# Patient Record
Sex: FEMALE | Race: ASIAN | NOT HISPANIC OR LATINO
[De-identification: names, ages, dates, MRNs, and addresses within clinical notes are randomized per-mention and may not be internally consistent; named-entity substitution may affect disease eponyms.]

---

## 2020-06-22 ENCOUNTER — APPOINTMENT (OUTPATIENT)
Dept: OBGYN | Facility: CLINIC | Age: 25
End: 2020-06-22
Payer: MEDICAID

## 2020-06-22 VITALS
DIASTOLIC BLOOD PRESSURE: 79 MMHG | HEART RATE: 77 BPM | HEIGHT: 66 IN | WEIGHT: 148 LBS | BODY MASS INDEX: 23.78 KG/M2 | SYSTOLIC BLOOD PRESSURE: 120 MMHG

## 2020-06-22 DIAGNOSIS — Z83.3 FAMILY HISTORY OF DIABETES MELLITUS: ICD-10-CM

## 2020-06-22 DIAGNOSIS — Z80.3 FAMILY HISTORY OF MALIGNANT NEOPLASM OF BREAST: ICD-10-CM

## 2020-06-22 PROCEDURE — 99203 OFFICE O/P NEW LOW 30 MIN: CPT

## 2020-06-24 LAB
ESTIMATED AVERAGE GLUCOSE: 103 MG/DL
HBA1C MFR BLD HPLC: 5.2 %
TSH SERPL-ACNC: 1.86 UIU/ML

## 2020-06-24 NOTE — COUNSELING
[Breast Self Exam] : breast self exam [Nutrition] : nutrition [Exercise] : exercise [Fertility Options] : fertility options [Vitamins/Supplements] : vitamins/supplements [Preconception Care] : preconception care

## 2020-06-24 NOTE — HISTORY OF PRESENT ILLNESS
[Last Pap ___] : Last cervical pap smear was [unfilled] [Sexually Active] : is sexually active [Monogamous (Male Partner)] : is monogamous with a male partner [Weight Concerns] : no concerns with her weight [Contraception] : does not use contraception

## 2020-06-24 NOTE — CHIEF COMPLAINT
[Initial Visit] : initial GYN visit [FreeTextEntry1] : Patient recently finished MPH at [employer] and is thinking of doing Phd vs medical school. Patient presently started working as a scribe.\par Patient has h/o irregular menses since age 18 but this past year has gotten more regular approx q35 days with occ skipping menses. Lmp 6/22 and prior was 4/19. Patient has been trying to conceive since 10/2019 and has been using ovulation kits.\par Patient in 2018 had bloods and only showed testosterone slightly elevated.\par Denies any change in weight or inc ailyn growth

## 2020-06-24 NOTE — PHYSICAL EXAM
[Awake] : awake [Alert] : alert [Acute Distress] : no acute distress [LAD] : no lymphadenopathy [Mass] : no breast mass [Thyroid Nodule] : no thyroid nodule [Goiter] : no goiter [Nipple Discharge] : no nipple discharge [Axillary LAD] : no axillary lymphadenopathy [Soft] : soft [Tender] : non tender [Oriented x3] : oriented to person, place, and time [No Bleeding] : there was no active vaginal bleeding [Normal] : uterus [Uterine Adnexae] : were not tender and not enlarged

## 2020-07-10 LAB
PROLACTIN SERPL-MCNC: 27.5 NG/ML
PROLACTIN SERPL-MCNC: 35.6 NG/ML

## 2020-09-17 ENCOUNTER — APPOINTMENT (OUTPATIENT)
Dept: OBGYN | Facility: CLINIC | Age: 25
End: 2020-09-17
Payer: MEDICAID

## 2020-09-17 ENCOUNTER — ASOB RESULT (OUTPATIENT)
Age: 25
End: 2020-09-17

## 2020-09-17 VITALS
BODY MASS INDEX: 23.46 KG/M2 | HEART RATE: 87 BPM | HEIGHT: 66 IN | DIASTOLIC BLOOD PRESSURE: 79 MMHG | SYSTOLIC BLOOD PRESSURE: 120 MMHG | TEMPERATURE: 97.9 F | WEIGHT: 146 LBS

## 2020-09-17 PROCEDURE — 76801 OB US < 14 WKS SINGLE FETUS: CPT

## 2020-09-17 PROCEDURE — 99214 OFFICE O/P EST MOD 30 MIN: CPT

## 2020-09-17 RX ORDER — ERGOCALCIFEROL (VITAMIN D2) 10 MCG
TABLET ORAL
Refills: 0 | Status: ACTIVE | COMMUNITY

## 2020-09-18 LAB
ABO + RH PNL BLD: NORMAL
BASOPHILS # BLD AUTO: 0.03 K/UL
BASOPHILS NFR BLD AUTO: 0.3 %
BLD GP AB SCN SERPL QL: NORMAL
C TRACH RRNA SPEC QL NAA+PROBE: NOT DETECTED
EOSINOPHIL # BLD AUTO: 0.07 K/UL
EOSINOPHIL NFR BLD AUTO: 0.8 %
HBV SURFACE AG SER QL: NONREACTIVE
HCT VFR BLD CALC: 38.4 %
HCV AB SER QL: NONREACTIVE
HCV S/CO RATIO: 0.13 S/CO
HGB A MFR BLD: 97.2 %
HGB A2 MFR BLD: 2.5 %
HGB BLD-MCNC: 12.4 G/DL
HGB F MFR BLD: 0.3 %
HGB FRACT BLD-IMP: NORMAL
HIV1+2 AB SPEC QL IA.RAPID: NONREACTIVE
IMM GRANULOCYTES NFR BLD AUTO: 0.3 %
LYMPHOCYTES # BLD AUTO: 1.98 K/UL
LYMPHOCYTES NFR BLD AUTO: 22.6 %
MAN DIFF?: NORMAL
MCHC RBC-ENTMCNC: 27.6 PG
MCHC RBC-ENTMCNC: 32.3 GM/DL
MCV RBC AUTO: 85.5 FL
MONOCYTES # BLD AUTO: 0.36 K/UL
MONOCYTES NFR BLD AUTO: 4.1 %
N GONORRHOEA RRNA SPEC QL NAA+PROBE: NOT DETECTED
NEUTROPHILS # BLD AUTO: 6.3 K/UL
NEUTROPHILS NFR BLD AUTO: 71.9 %
PLATELET # BLD AUTO: 281 K/UL
RBC # BLD: 4.49 M/UL
RBC # FLD: 13.2 %
SOURCE AMPLIFICATION: NORMAL
T PALLIDUM AB SER QL IA: NEGATIVE
TSH SERPL-ACNC: 1.32 UIU/ML
WBC # FLD AUTO: 8.77 K/UL

## 2020-09-18 NOTE — HISTORY OF PRESENT ILLNESS
[TextBox_4] : Patient presents to confirm pregnancy.\par Had irregular menses but past few months menses have been regular [LMPDate] : 7/21/20 [MensesFreq] : 35 [PGxTotal] : 1 [PGHxFullTerm] : 0

## 2020-09-18 NOTE — COUNSELING
[Nutrition/ Exercise/ Weight Management] : nutrition, exercise, weight management [Vitamins/Supplements] : vitamins/supplements [Sunscreen] : sunscreen [Drugs/Alcohol] : drugs, alcohol [Vaccines] : vaccines [Influenza Vaccine] : influenza vaccine

## 2020-09-22 LAB
B19V IGG SER QL IA: 0.2 INDEX
B19V IGG+IGM SER-IMP: NEGATIVE
B19V IGG+IGM SER-IMP: NORMAL
B19V IGM FLD-ACNC: 0.1
B19V IGM SER-ACNC: NEGATIVE
BACTERIA UR CULT: NORMAL
LEAD BLD-MCNC: <1 UG/DL
MEV IGG FLD QL IA: 49.1 AU/ML
MEV IGG+IGM SER-IMP: POSITIVE
RUBV IGG FLD-ACNC: 8.8 INDEX
RUBV IGG SER-IMP: POSITIVE
T GONDII AB SER-IMP: NEGATIVE
T GONDII AB SER-IMP: NEGATIVE
T GONDII IGG SER QL: <3 IU/ML
T GONDII IGM SER QL: <3 AU/ML
VZV AB TITR SER: POSITIVE
VZV IGG SER IF-ACNC: 721.7 INDEX

## 2020-09-23 LAB — CYTOLOGY CVX/VAG DOC THIN PREP: NORMAL

## 2020-09-24 LAB
AR GENE MUT ANL BLD/T: NORMAL
FMR1 GENE MUT ANL BLD/T: NORMAL

## 2020-09-27 LAB — CFTR MUT TESTED BLD/T: NEGATIVE

## 2020-10-07 ENCOUNTER — APPOINTMENT (OUTPATIENT)
Dept: CARDIOLOGY | Facility: CLINIC | Age: 25
End: 2020-10-07
Payer: MEDICAID

## 2020-10-07 VITALS
SYSTOLIC BLOOD PRESSURE: 108 MMHG | HEART RATE: 74 BPM | OXYGEN SATURATION: 100 % | TEMPERATURE: 97.7 F | BODY MASS INDEX: 23.24 KG/M2 | DIASTOLIC BLOOD PRESSURE: 73 MMHG | HEIGHT: 66 IN

## 2020-10-07 VITALS — WEIGHT: 144 LBS | BODY MASS INDEX: 23.24 KG/M2

## 2020-10-07 PROCEDURE — 99205 OFFICE O/P NEW HI 60 MIN: CPT

## 2020-10-07 PROCEDURE — 93000 ELECTROCARDIOGRAM COMPLETE: CPT

## 2020-10-14 ENCOUNTER — APPOINTMENT (OUTPATIENT)
Dept: OBGYN | Facility: CLINIC | Age: 25
End: 2020-10-14
Payer: MEDICAID

## 2020-10-14 ENCOUNTER — NON-APPOINTMENT (OUTPATIENT)
Age: 25
End: 2020-10-14

## 2020-10-14 ENCOUNTER — LABORATORY RESULT (OUTPATIENT)
Age: 25
End: 2020-10-14

## 2020-10-14 ENCOUNTER — ASOB RESULT (OUTPATIENT)
Age: 25
End: 2020-10-14

## 2020-10-14 ENCOUNTER — APPOINTMENT (OUTPATIENT)
Dept: ANTEPARTUM | Facility: CLINIC | Age: 25
End: 2020-10-14
Payer: MEDICAID

## 2020-10-14 VITALS
SYSTOLIC BLOOD PRESSURE: 119 MMHG | HEIGHT: 66 IN | DIASTOLIC BLOOD PRESSURE: 78 MMHG | WEIGHT: 139 LBS | BODY MASS INDEX: 22.34 KG/M2

## 2020-10-14 DIAGNOSIS — Z23 ENCOUNTER FOR IMMUNIZATION: ICD-10-CM

## 2020-10-14 PROCEDURE — 36416 COLLJ CAPILLARY BLOOD SPEC: CPT

## 2020-10-14 PROCEDURE — 90686 IIV4 VACC NO PRSV 0.5 ML IM: CPT

## 2020-10-14 PROCEDURE — G0008: CPT

## 2020-10-14 PROCEDURE — 76813 OB US NUCHAL MEAS 1 GEST: CPT

## 2020-10-14 PROCEDURE — 76801 OB US < 14 WKS SINGLE FETUS: CPT

## 2020-10-19 ENCOUNTER — OUTPATIENT (OUTPATIENT)
Dept: OUTPATIENT SERVICES | Facility: HOSPITAL | Age: 25
LOS: 1 days | End: 2020-10-19

## 2020-10-19 ENCOUNTER — APPOINTMENT (OUTPATIENT)
Dept: CV DIAGNOSITCS | Facility: HOSPITAL | Age: 25
End: 2020-10-19
Payer: MEDICAID

## 2020-10-19 DIAGNOSIS — R00.2 PALPITATIONS: ICD-10-CM

## 2020-10-19 PROCEDURE — 93306 TTE W/DOPPLER COMPLETE: CPT | Mod: 26

## 2020-10-20 ENCOUNTER — TRANSCRIPTION ENCOUNTER (OUTPATIENT)
Age: 25
End: 2020-10-20

## 2020-10-21 ENCOUNTER — NON-APPOINTMENT (OUTPATIENT)
Age: 25
End: 2020-10-21

## 2020-10-29 ENCOUNTER — NON-APPOINTMENT (OUTPATIENT)
Age: 25
End: 2020-10-29

## 2020-10-30 ENCOUNTER — NON-APPOINTMENT (OUTPATIENT)
Age: 25
End: 2020-10-30

## 2020-11-11 ENCOUNTER — APPOINTMENT (OUTPATIENT)
Dept: CARDIOLOGY | Facility: CLINIC | Age: 25
End: 2020-11-11

## 2020-11-12 ENCOUNTER — NON-APPOINTMENT (OUTPATIENT)
Age: 25
End: 2020-11-12

## 2020-11-12 ENCOUNTER — LABORATORY RESULT (OUTPATIENT)
Age: 25
End: 2020-11-12

## 2020-11-12 ENCOUNTER — APPOINTMENT (OUTPATIENT)
Dept: OBGYN | Facility: CLINIC | Age: 25
End: 2020-11-12
Payer: MEDICAID

## 2020-11-12 VITALS
DIASTOLIC BLOOD PRESSURE: 72 MMHG | BODY MASS INDEX: 22.98 KG/M2 | WEIGHT: 143 LBS | HEIGHT: 66 IN | SYSTOLIC BLOOD PRESSURE: 107 MMHG

## 2020-11-12 PROCEDURE — 0502F SUBSEQUENT PRENATAL CARE: CPT

## 2020-11-13 ENCOUNTER — APPOINTMENT (OUTPATIENT)
Dept: CARDIOLOGY | Facility: CLINIC | Age: 25
End: 2020-11-13
Payer: MEDICAID

## 2020-11-13 DIAGNOSIS — R00.2 PALPITATIONS: ICD-10-CM

## 2020-11-13 DIAGNOSIS — R00.0 TACHYCARDIA, UNSPECIFIED: ICD-10-CM

## 2020-11-13 DIAGNOSIS — Z13.6 ENCOUNTER FOR SCREENING FOR CARDIOVASCULAR DISORDERS: ICD-10-CM

## 2020-11-13 PROCEDURE — 99214 OFFICE O/P EST MOD 30 MIN: CPT | Mod: 95

## 2020-11-15 PROBLEM — R00.2 HEART PALPITATIONS: Status: ACTIVE | Noted: 2020-10-07

## 2020-11-15 PROBLEM — Z13.6 SCREENING FOR CARDIOVASCULAR CONDITION: Status: ACTIVE | Noted: 2020-10-07

## 2020-11-15 PROBLEM — R00.0 TACHYCARDIA: Status: ACTIVE | Noted: 2020-10-07

## 2020-11-23 ENCOUNTER — NON-APPOINTMENT (OUTPATIENT)
Age: 25
End: 2020-11-23

## 2020-11-23 ENCOUNTER — OUTPATIENT (OUTPATIENT)
Dept: INPATIENT UNIT | Facility: HOSPITAL | Age: 25
LOS: 1 days | Discharge: HOME | End: 2020-11-23

## 2020-11-23 VITALS
RESPIRATION RATE: 18 BRPM | DIASTOLIC BLOOD PRESSURE: 77 MMHG | TEMPERATURE: 98 F | SYSTOLIC BLOOD PRESSURE: 112 MMHG | HEART RATE: 88 BPM

## 2020-11-23 DIAGNOSIS — Z3A.00 WEEKS OF GESTATION OF PREGNANCY NOT SPECIFIED: ICD-10-CM

## 2020-11-23 DIAGNOSIS — O26.899 OTHER SPECIFIED PREGNANCY RELATED CONDITIONS, UNSPECIFIED TRIMESTER: ICD-10-CM

## 2020-11-23 NOTE — OB PROVIDER TRIAGE NOTE - PROCEDURE 1
Decreased fetal movement affecting management of pregnancy in second trimester, single or unspecified fetus

## 2020-11-23 NOTE — OB PROVIDER TRIAGE NOTE - YOU WERE IN THE HOSPITAL FOR:
No evidence of concerns, maternal and/or fetal.  - discussed with   - patient cleared for discharge, to keep scheduled appointment for 12/9 (anatomy scan) and 12/10 ( with )

## 2020-11-23 NOTE — OB PROVIDER TRIAGE NOTE - HISTORY OF PRESENT ILLNESS
's patient is a 26 y/o EDC 2021 EGA 17   reports of no fetal movement since Thursday. Patient brought into [facility] room for ultrasound and assessment. Patient reports of feeling movement last week and all of sudden movement stopped. Patient denies cramping, contractions, loss of fluid, vaginal bleeding.     AP complications: Denies  Medical History: Denies  Surgical History: Denies  OBGYN History: Denies

## 2020-11-23 NOTE — OB PROVIDER TRIAGE NOTE - NSHPPHYSICALEXAM_GEN_ALL_CORE
1320 Vital Sings:  T 36.9  Blood Pressure 112/77  HR 88    Fetal Heart Rate: 140s on fetal monitor    TAS: transverse presentation, FHR 140s confirmed, anterior placenta, fetal movement and tone present, MVP 5    Patient visualized fetal heart beat.

## 2020-12-09 ENCOUNTER — ASOB RESULT (OUTPATIENT)
Age: 25
End: 2020-12-09

## 2020-12-09 ENCOUNTER — APPOINTMENT (OUTPATIENT)
Dept: ANTEPARTUM | Facility: CLINIC | Age: 25
End: 2020-12-09
Payer: MEDICAID

## 2020-12-09 PROCEDURE — 76805 OB US >/= 14 WKS SNGL FETUS: CPT

## 2020-12-09 PROCEDURE — 99072 ADDL SUPL MATRL&STAF TM PHE: CPT

## 2020-12-10 ENCOUNTER — NON-APPOINTMENT (OUTPATIENT)
Age: 25
End: 2020-12-10

## 2020-12-10 ENCOUNTER — APPOINTMENT (OUTPATIENT)
Dept: OBGYN | Facility: CLINIC | Age: 25
End: 2020-12-10
Payer: MEDICAID

## 2020-12-10 VITALS
HEIGHT: 66 IN | BODY MASS INDEX: 23.63 KG/M2 | SYSTOLIC BLOOD PRESSURE: 124 MMHG | DIASTOLIC BLOOD PRESSURE: 80 MMHG | TEMPERATURE: 97.7 F | WEIGHT: 147 LBS

## 2020-12-10 PROBLEM — Z78.9 OTHER SPECIFIED HEALTH STATUS: Chronic | Status: ACTIVE | Noted: 2020-11-23

## 2020-12-10 PROCEDURE — 0502F SUBSEQUENT PRENATAL CARE: CPT

## 2021-01-14 ENCOUNTER — APPOINTMENT (OUTPATIENT)
Dept: OBGYN | Facility: CLINIC | Age: 26
End: 2021-01-14
Payer: MEDICAID

## 2021-01-14 ENCOUNTER — NON-APPOINTMENT (OUTPATIENT)
Age: 26
End: 2021-01-14

## 2021-01-14 VITALS
DIASTOLIC BLOOD PRESSURE: 72 MMHG | SYSTOLIC BLOOD PRESSURE: 113 MMHG | BODY MASS INDEX: 25.63 KG/M2 | HEIGHT: 66 IN | WEIGHT: 159.5 LBS

## 2021-01-14 PROCEDURE — 0502F SUBSEQUENT PRENATAL CARE: CPT

## 2021-01-15 LAB
BASOPHILS # BLD AUTO: 0.04 K/UL
BASOPHILS NFR BLD AUTO: 0.5 %
EOSINOPHIL # BLD AUTO: 0.06 K/UL
EOSINOPHIL NFR BLD AUTO: 0.8 %
HCT VFR BLD CALC: 34 %
HGB BLD-MCNC: 10.6 G/DL
IMM GRANULOCYTES NFR BLD AUTO: 0.6 %
LYMPHOCYTES # BLD AUTO: 1.2 K/UL
LYMPHOCYTES NFR BLD AUTO: 15 %
MAN DIFF?: NORMAL
MCHC RBC-ENTMCNC: 28 PG
MCHC RBC-ENTMCNC: 31.2 GM/DL
MCV RBC AUTO: 89.7 FL
MONOCYTES # BLD AUTO: 0.5 K/UL
MONOCYTES NFR BLD AUTO: 6.3 %
NEUTROPHILS # BLD AUTO: 6.13 K/UL
NEUTROPHILS NFR BLD AUTO: 76.8 %
PLATELET # BLD AUTO: 280 K/UL
RBC # BLD: 3.79 M/UL
RBC # FLD: 13.1 %
WBC # FLD AUTO: 7.98 K/UL

## 2021-01-18 LAB
GLUCOSE 1H P 50 G GLC PO SERPL-MCNC: 80 MG/DL
T PALLIDUM AB SER QL IA: NEGATIVE

## 2021-02-04 ENCOUNTER — NON-APPOINTMENT (OUTPATIENT)
Age: 26
End: 2021-02-04

## 2021-02-04 ENCOUNTER — APPOINTMENT (OUTPATIENT)
Dept: OBGYN | Facility: CLINIC | Age: 26
End: 2021-02-04
Payer: MEDICAID

## 2021-02-04 VITALS
HEIGHT: 66 IN | WEIGHT: 164.7 LBS | BODY MASS INDEX: 26.47 KG/M2 | TEMPERATURE: 97.8 F | DIASTOLIC BLOOD PRESSURE: 72 MMHG | SYSTOLIC BLOOD PRESSURE: 110 MMHG

## 2021-02-04 PROCEDURE — 0502F SUBSEQUENT PRENATAL CARE: CPT

## 2021-02-25 ENCOUNTER — APPOINTMENT (OUTPATIENT)
Dept: OBGYN | Facility: CLINIC | Age: 26
End: 2021-02-25
Payer: MEDICAID

## 2021-02-25 ENCOUNTER — NON-APPOINTMENT (OUTPATIENT)
Age: 26
End: 2021-02-25

## 2021-02-25 VITALS
TEMPERATURE: 97.7 F | BODY MASS INDEX: 27.16 KG/M2 | HEIGHT: 66 IN | DIASTOLIC BLOOD PRESSURE: 76 MMHG | WEIGHT: 169 LBS | SYSTOLIC BLOOD PRESSURE: 106 MMHG

## 2021-02-25 PROCEDURE — 0502F SUBSEQUENT PRENATAL CARE: CPT

## 2021-02-26 ENCOUNTER — NON-APPOINTMENT (OUTPATIENT)
Age: 26
End: 2021-02-26

## 2021-03-17 ENCOUNTER — APPOINTMENT (OUTPATIENT)
Dept: ANTEPARTUM | Facility: CLINIC | Age: 26
End: 2021-03-17
Payer: MEDICAID

## 2021-03-17 ENCOUNTER — ASOB RESULT (OUTPATIENT)
Age: 26
End: 2021-03-17

## 2021-03-17 ENCOUNTER — APPOINTMENT (OUTPATIENT)
Dept: OBGYN | Facility: CLINIC | Age: 26
End: 2021-03-17
Payer: MEDICAID

## 2021-03-17 ENCOUNTER — NON-APPOINTMENT (OUTPATIENT)
Age: 26
End: 2021-03-17

## 2021-03-17 VITALS
HEIGHT: 66 IN | SYSTOLIC BLOOD PRESSURE: 117 MMHG | DIASTOLIC BLOOD PRESSURE: 82 MMHG | WEIGHT: 173 LBS | BODY MASS INDEX: 27.8 KG/M2

## 2021-03-17 PROCEDURE — 76816 OB US FOLLOW-UP PER FETUS: CPT

## 2021-03-17 PROCEDURE — 76819 FETAL BIOPHYS PROFIL W/O NST: CPT

## 2021-03-17 PROCEDURE — 99072 ADDL SUPL MATRL&STAF TM PHE: CPT

## 2021-03-17 PROCEDURE — 0502F SUBSEQUENT PRENATAL CARE: CPT

## 2021-03-19 ENCOUNTER — TRANSCRIPTION ENCOUNTER (OUTPATIENT)
Age: 26
End: 2021-03-19

## 2021-03-23 ENCOUNTER — NON-APPOINTMENT (OUTPATIENT)
Age: 26
End: 2021-03-23

## 2021-03-23 LAB
GLUCOSE 1H P 100 G GLC PO SERPL-MCNC: 187 MG/DL
GLUCOSE 2H P CHAL SERPL-MCNC: 127 MG/DL
GLUCOSE 3H P CHAL SERPL-MCNC: 97 MG/DL
GLUCOSE BS SERPL-MCNC: 83 MG/DL

## 2021-03-31 ENCOUNTER — APPOINTMENT (OUTPATIENT)
Dept: OBGYN | Facility: CLINIC | Age: 26
End: 2021-03-31
Payer: MEDICAID

## 2021-03-31 VITALS
WEIGHT: 179 LBS | DIASTOLIC BLOOD PRESSURE: 76 MMHG | SYSTOLIC BLOOD PRESSURE: 109 MMHG | HEIGHT: 66 IN | BODY MASS INDEX: 28.77 KG/M2 | TEMPERATURE: 97.2 F

## 2021-03-31 PROCEDURE — 0502F SUBSEQUENT PRENATAL CARE: CPT

## 2021-04-01 ENCOUNTER — NON-APPOINTMENT (OUTPATIENT)
Age: 26
End: 2021-04-01

## 2021-04-05 LAB
GP B STREP DNA SPEC QL NAA+PROBE: NORMAL
GP B STREP DNA SPEC QL NAA+PROBE: NOT DETECTED
SOURCE GBS: NORMAL

## 2021-04-08 ENCOUNTER — APPOINTMENT (OUTPATIENT)
Dept: OBGYN | Facility: CLINIC | Age: 26
End: 2021-04-08
Payer: MEDICAID

## 2021-04-08 ENCOUNTER — NON-APPOINTMENT (OUTPATIENT)
Age: 26
End: 2021-04-08

## 2021-04-08 VITALS
BODY MASS INDEX: 29.09 KG/M2 | WEIGHT: 181 LBS | SYSTOLIC BLOOD PRESSURE: 118 MMHG | DIASTOLIC BLOOD PRESSURE: 79 MMHG | HEIGHT: 66 IN

## 2021-04-08 PROCEDURE — 0502F SUBSEQUENT PRENATAL CARE: CPT

## 2021-04-14 ENCOUNTER — NON-APPOINTMENT (OUTPATIENT)
Age: 26
End: 2021-04-14

## 2021-04-14 ENCOUNTER — INPATIENT (INPATIENT)
Facility: HOSPITAL | Age: 26
LOS: 2 days | Discharge: HOME | End: 2021-04-17
Attending: OBSTETRICS & GYNECOLOGY | Admitting: OBSTETRICS & GYNECOLOGY
Payer: MEDICAID

## 2021-04-14 ENCOUNTER — APPOINTMENT (OUTPATIENT)
Dept: OBGYN | Facility: CLINIC | Age: 26
End: 2021-04-14
Payer: MEDICAID

## 2021-04-14 VITALS
SYSTOLIC BLOOD PRESSURE: 114 MMHG | TEMPERATURE: 99 F | HEART RATE: 88 BPM | RESPIRATION RATE: 16 BRPM | DIASTOLIC BLOOD PRESSURE: 68 MMHG

## 2021-04-14 VITALS
SYSTOLIC BLOOD PRESSURE: 122 MMHG | HEIGHT: 66 IN | DIASTOLIC BLOOD PRESSURE: 82 MMHG | BODY MASS INDEX: 28.93 KG/M2 | WEIGHT: 180 LBS

## 2021-04-14 DIAGNOSIS — O42.92 FULL-TERM PREMATURE RUPTURE OF MEMBRANES, UNSPECIFIED AS TO LENGTH OF TIME BETWEEN RUPTURE AND ONSET OF LABOR: ICD-10-CM

## 2021-04-14 DIAGNOSIS — O26.899 OTHER SPECIFIED PREGNANCY RELATED CONDITIONS, UNSPECIFIED TRIMESTER: ICD-10-CM

## 2021-04-14 DIAGNOSIS — Z3A.00 WEEKS OF GESTATION OF PREGNANCY NOT SPECIFIED: ICD-10-CM

## 2021-04-14 LAB
BASOPHILS # BLD AUTO: 0.03 K/UL — SIGNIFICANT CHANGE UP (ref 0–0.2)
BASOPHILS NFR BLD AUTO: 0.4 % — SIGNIFICANT CHANGE UP (ref 0–2)
BLD GP AB SCN SERPL QL: NEGATIVE — SIGNIFICANT CHANGE UP
EOSINOPHIL # BLD AUTO: 0.03 K/UL — SIGNIFICANT CHANGE UP (ref 0–0.5)
EOSINOPHIL NFR BLD AUTO: 0.4 % — SIGNIFICANT CHANGE UP (ref 0–6)
HCT VFR BLD CALC: 35.9 % — SIGNIFICANT CHANGE UP (ref 34.5–45)
HGB BLD-MCNC: 11.7 G/DL — SIGNIFICANT CHANGE UP (ref 11.5–15.5)
IANC: 5.82 K/UL — SIGNIFICANT CHANGE UP (ref 1.5–8.5)
IMM GRANULOCYTES NFR BLD AUTO: 0.6 % — SIGNIFICANT CHANGE UP (ref 0–1.5)
LYMPHOCYTES # BLD AUTO: 1.35 K/UL — SIGNIFICANT CHANGE UP (ref 1–3.3)
LYMPHOCYTES # BLD AUTO: 17.5 % — SIGNIFICANT CHANGE UP (ref 13–44)
MCHC RBC-ENTMCNC: 27 PG — SIGNIFICANT CHANGE UP (ref 27–34)
MCHC RBC-ENTMCNC: 32.6 GM/DL — SIGNIFICANT CHANGE UP (ref 32–36)
MCV RBC AUTO: 82.7 FL — SIGNIFICANT CHANGE UP (ref 80–100)
MONOCYTES # BLD AUTO: 0.44 K/UL — SIGNIFICANT CHANGE UP (ref 0–0.9)
MONOCYTES NFR BLD AUTO: 5.7 % — SIGNIFICANT CHANGE UP (ref 2–14)
NEUTROPHILS # BLD AUTO: 5.82 K/UL — SIGNIFICANT CHANGE UP (ref 1.8–7.4)
NEUTROPHILS NFR BLD AUTO: 75.4 % — SIGNIFICANT CHANGE UP (ref 43–77)
NRBC # BLD: 0 /100 WBCS — SIGNIFICANT CHANGE UP
NRBC # FLD: 0 K/UL — SIGNIFICANT CHANGE UP
PLATELET # BLD AUTO: 244 K/UL — SIGNIFICANT CHANGE UP (ref 150–400)
RBC # BLD: 4.34 M/UL — SIGNIFICANT CHANGE UP (ref 3.8–5.2)
RBC # FLD: 16 % — HIGH (ref 10.3–14.5)
RH IG SCN BLD-IMP: POSITIVE — SIGNIFICANT CHANGE UP
RH IG SCN BLD-IMP: POSITIVE — SIGNIFICANT CHANGE UP
WBC # BLD: 7.72 K/UL — SIGNIFICANT CHANGE UP (ref 3.8–10.5)
WBC # FLD AUTO: 7.72 K/UL — SIGNIFICANT CHANGE UP (ref 3.8–10.5)

## 2021-04-14 PROCEDURE — 0502F SUBSEQUENT PRENATAL CARE: CPT

## 2021-04-14 RX ORDER — OXYTOCIN 10 UNIT/ML
333.33 VIAL (ML) INJECTION
Qty: 20 | Refills: 0 | Status: DISCONTINUED | OUTPATIENT
Start: 2021-04-14 | End: 2021-04-15

## 2021-04-14 RX ORDER — SODIUM CHLORIDE 9 MG/ML
1000 INJECTION, SOLUTION INTRAVENOUS
Refills: 0 | Status: DISCONTINUED | OUTPATIENT
Start: 2021-04-14 | End: 2021-04-15

## 2021-04-14 RX ORDER — SODIUM CHLORIDE 9 MG/ML
1000 INJECTION, SOLUTION INTRAVENOUS
Refills: 0 | Status: DISCONTINUED | OUTPATIENT
Start: 2021-04-14 | End: 2021-04-14

## 2021-04-14 RX ORDER — OXYTOCIN 10 UNIT/ML
333.33 VIAL (ML) INJECTION
Qty: 20 | Refills: 0 | Status: DISCONTINUED | OUTPATIENT
Start: 2021-04-14 | End: 2021-04-14

## 2021-04-14 RX ORDER — BUTORPHANOL TARTRATE 2 MG/ML
2 INJECTION, SOLUTION INTRAMUSCULAR; INTRAVENOUS ONCE
Refills: 0 | Status: DISCONTINUED | OUTPATIENT
Start: 2021-04-14 | End: 2021-04-15

## 2021-04-14 NOTE — OB PROVIDER H&P - HISTORY OF PRESENT ILLNESS
27yo  @ 38.1 presents with c/o LOF since 1600 and contractions every 10 minutes. Denies VB and reports GFM.   Denies history of COVID-19 or recent exposure    Denies PMH/PSH

## 2021-04-14 NOTE — OB PROVIDER TRIAGE NOTE - NSHPPHYSICALEXAM_GEN_ALL_CORE
Assessment reveals VSS, abdomen soft, NT, gravid.  Noted to be grossly ruptured clear fluid  VE 0/50/-3  EFW 7.0 by palp  Vtx confirmed by sono  Cat 1 FHT, ctx 5-6 on toco

## 2021-04-14 NOTE — OB PROVIDER TRIAGE NOTE - HISTORY OF PRESENT ILLNESS
25yo  @ 38.1 presents with c/o LOF since 1600 and contractions every 10 minutes. Denies VB and reports GFM.   Denies history of COVID-19 or recent exposure    Denies PMH/PSH

## 2021-04-14 NOTE — OB PROVIDER LABOR PROGRESS NOTE - ASSESSMENT
26y  at 38w1d admitted for IOL after SROM at 4PM:    - Labor: Continue induction with PO Cytotec   - Fetus: Category I tracing    - GBS: Negative    - Analgesia: IV Stadol ordered for pain    d/w Dr. Jose Daniel Rivero, PGY-2

## 2021-04-15 ENCOUNTER — TRANSCRIPTION ENCOUNTER (OUTPATIENT)
Age: 26
End: 2021-04-15

## 2021-04-15 LAB
COVID-19 SPIKE DOMAIN AB INTERP: NEGATIVE — SIGNIFICANT CHANGE UP
COVID-19 SPIKE DOMAIN ANTIBODY RESULT: 0.4 U/ML — SIGNIFICANT CHANGE UP
SARS-COV-2 IGG+IGM SERPL QL IA: 0.4 U/ML — SIGNIFICANT CHANGE UP
SARS-COV-2 IGG+IGM SERPL QL IA: NEGATIVE — SIGNIFICANT CHANGE UP
SARS-COV-2 RNA SPEC QL NAA+PROBE: SIGNIFICANT CHANGE UP
T PALLIDUM AB TITR SER: NEGATIVE — SIGNIFICANT CHANGE UP

## 2021-04-15 PROCEDURE — 59400 OBSTETRICAL CARE: CPT | Mod: U9,UB,GC

## 2021-04-15 RX ORDER — OXYCODONE HYDROCHLORIDE 5 MG/1
5 TABLET ORAL
Refills: 0 | Status: DISCONTINUED | OUTPATIENT
Start: 2021-04-15 | End: 2021-04-17

## 2021-04-15 RX ORDER — TETANUS TOXOID, REDUCED DIPHTHERIA TOXOID AND ACELLULAR PERTUSSIS VACCINE, ADSORBED 5; 2.5; 8; 8; 2.5 [IU]/.5ML; [IU]/.5ML; UG/.5ML; UG/.5ML; UG/.5ML
0.5 SUSPENSION INTRAMUSCULAR ONCE
Refills: 0 | Status: DISCONTINUED | OUTPATIENT
Start: 2021-04-15 | End: 2021-04-17

## 2021-04-15 RX ORDER — IBUPROFEN 200 MG
600 TABLET ORAL EVERY 6 HOURS
Refills: 0 | Status: COMPLETED | OUTPATIENT
Start: 2021-04-15 | End: 2022-03-14

## 2021-04-15 RX ORDER — KETOROLAC TROMETHAMINE 30 MG/ML
30 SYRINGE (ML) INJECTION ONCE
Refills: 0 | Status: DISCONTINUED | OUTPATIENT
Start: 2021-04-15 | End: 2021-04-15

## 2021-04-15 RX ORDER — DIBUCAINE 1 %
1 OINTMENT (GRAM) RECTAL EVERY 6 HOURS
Refills: 0 | Status: DISCONTINUED | OUTPATIENT
Start: 2021-04-15 | End: 2021-04-17

## 2021-04-15 RX ORDER — ACETAMINOPHEN 500 MG
975 TABLET ORAL
Refills: 0 | Status: DISCONTINUED | OUTPATIENT
Start: 2021-04-15 | End: 2021-04-17

## 2021-04-15 RX ORDER — BENZOCAINE 10 %
1 GEL (GRAM) MUCOUS MEMBRANE EVERY 6 HOURS
Refills: 0 | Status: DISCONTINUED | OUTPATIENT
Start: 2021-04-15 | End: 2021-04-17

## 2021-04-15 RX ORDER — HYDROCORTISONE 1 %
1 OINTMENT (GRAM) TOPICAL EVERY 6 HOURS
Refills: 0 | Status: DISCONTINUED | OUTPATIENT
Start: 2021-04-15 | End: 2021-04-17

## 2021-04-15 RX ORDER — IBUPROFEN 200 MG
1 TABLET ORAL
Qty: 0 | Refills: 0 | DISCHARGE
Start: 2021-04-15

## 2021-04-15 RX ORDER — DIPHENHYDRAMINE HCL 50 MG
25 CAPSULE ORAL EVERY 6 HOURS
Refills: 0 | Status: DISCONTINUED | OUTPATIENT
Start: 2021-04-15 | End: 2021-04-17

## 2021-04-15 RX ORDER — AER TRAVELER 0.5 G/1
1 SOLUTION RECTAL; TOPICAL EVERY 4 HOURS
Refills: 0 | Status: DISCONTINUED | OUTPATIENT
Start: 2021-04-15 | End: 2021-04-17

## 2021-04-15 RX ORDER — ACETAMINOPHEN 500 MG
3 TABLET ORAL
Qty: 0 | Refills: 0 | DISCHARGE
Start: 2021-04-15

## 2021-04-15 RX ORDER — SODIUM CHLORIDE 9 MG/ML
3 INJECTION INTRAMUSCULAR; INTRAVENOUS; SUBCUTANEOUS EVERY 8 HOURS
Refills: 0 | Status: DISCONTINUED | OUTPATIENT
Start: 2021-04-15 | End: 2021-04-17

## 2021-04-15 RX ORDER — PRAMOXINE HYDROCHLORIDE 150 MG/15G
1 AEROSOL, FOAM RECTAL EVERY 4 HOURS
Refills: 0 | Status: DISCONTINUED | OUTPATIENT
Start: 2021-04-15 | End: 2021-04-17

## 2021-04-15 RX ORDER — LANOLIN
1 OINTMENT (GRAM) TOPICAL EVERY 6 HOURS
Refills: 0 | Status: DISCONTINUED | OUTPATIENT
Start: 2021-04-15 | End: 2021-04-17

## 2021-04-15 RX ORDER — OXYTOCIN 10 UNIT/ML
2 VIAL (ML) INJECTION
Qty: 30 | Refills: 0 | Status: DISCONTINUED | OUTPATIENT
Start: 2021-04-15 | End: 2021-04-15

## 2021-04-15 RX ORDER — OXYTOCIN 10 UNIT/ML
333.33 VIAL (ML) INJECTION
Qty: 20 | Refills: 0 | Status: DISCONTINUED | OUTPATIENT
Start: 2021-04-15 | End: 2021-04-15

## 2021-04-15 RX ORDER — SIMETHICONE 80 MG/1
80 TABLET, CHEWABLE ORAL EVERY 4 HOURS
Refills: 0 | Status: DISCONTINUED | OUTPATIENT
Start: 2021-04-15 | End: 2021-04-17

## 2021-04-15 RX ORDER — OXYCODONE HYDROCHLORIDE 5 MG/1
5 TABLET ORAL ONCE
Refills: 0 | Status: DISCONTINUED | OUTPATIENT
Start: 2021-04-15 | End: 2021-04-17

## 2021-04-15 RX ORDER — MAGNESIUM HYDROXIDE 400 MG/1
30 TABLET, CHEWABLE ORAL
Refills: 0 | Status: DISCONTINUED | OUTPATIENT
Start: 2021-04-15 | End: 2021-04-17

## 2021-04-15 RX ORDER — OXYTOCIN 10 UNIT/ML
1 VIAL (ML) INJECTION
Qty: 30 | Refills: 0 | Status: DISCONTINUED | OUTPATIENT
Start: 2021-04-15 | End: 2021-04-15

## 2021-04-15 RX ADMIN — Medication 1000 MILLIUNIT(S)/MIN: at 15:10

## 2021-04-15 RX ADMIN — Medication 30 MILLIGRAM(S): at 16:08

## 2021-04-15 RX ADMIN — SODIUM CHLORIDE 125 MILLILITER(S): 9 INJECTION, SOLUTION INTRAVENOUS at 00:04

## 2021-04-15 RX ADMIN — BUTORPHANOL TARTRATE 2 MILLIGRAM(S): 2 INJECTION, SOLUTION INTRAMUSCULAR; INTRAVENOUS at 00:04

## 2021-04-15 RX ADMIN — Medication 975 MILLIGRAM(S): at 23:59

## 2021-04-15 RX ADMIN — Medication 2 MILLIUNIT(S)/MIN: at 10:17

## 2021-04-15 RX ADMIN — SODIUM CHLORIDE 3 MILLILITER(S): 9 INJECTION INTRAMUSCULAR; INTRAVENOUS; SUBCUTANEOUS at 22:00

## 2021-04-15 RX ADMIN — BUTORPHANOL TARTRATE 2 MILLIGRAM(S): 2 INJECTION, SOLUTION INTRAMUSCULAR; INTRAVENOUS at 00:25

## 2021-04-15 RX ADMIN — SODIUM CHLORIDE 125 MILLILITER(S): 9 INJECTION, SOLUTION INTRAVENOUS at 06:41

## 2021-04-15 RX ADMIN — Medication 1 MILLIUNIT(S)/MIN: at 14:34

## 2021-04-15 NOTE — OB PROVIDER LABOR PROGRESS NOTE - NS_SUBJECTIVE/OBJECTIVE_OBGYN_ALL_OB_FT
Pt seen and evaluated at bedside, endorsing increasing pain with ctx
Tracing with decel for 5-6min with cherelle to 90s. Patient repositioned, bolus given, O2 applied. SVE as below. Tracing recovered
Patient seen bedside for increasing rectal pressure. Comfortable with epidural.
patient evaluated bedside for labor progression. Patient reports increased pain with contractions, desires something for pain.

## 2021-04-15 NOTE — OB PROVIDER LABOR PROGRESS NOTE - NS_OBIHIFHRDETAILS_OBGYN_ALL_OB_FT
Baseline 140, moderate variability, accelerations present, occasional variable deceleration with recovery
135 moderate variability +accels -decels
CAT I  150BM  moderate variability  +accels  -decels
140s baseline, mod karla, +accels, +prolonged decel now recovered

## 2021-04-15 NOTE — CHART NOTE - NSCHARTNOTEFT_GEN_A_CORE
Att Note:  Pt feeling more uncomfortable with ctx, having pressure. Fully dilated asynclitic vtx, suspect OP also. Will start pushing and will ask Dr Lake to evaluate pt to try to turn fetus to OA position. Tracie Mcginnis MD

## 2021-04-15 NOTE — OB RN DELIVERY SUMMARY - NS_SEPSISRSKCALC_OBGYN_ALL_OB_FT
EOS calculated successfully. EOS Risk Factor: 0.5/1000 live births ([facility] national incidence); GA=38w2d; Temp=98.6; ROM=22.667; GBS='Negative'; Antibiotics='No antibiotics or any antibiotics < 2 hrs prior to birth'

## 2021-04-15 NOTE — OB PROVIDER LABOR PROGRESS NOTE - ASSESSMENT
26 y.o.  at 38w2d presenting in early labor induced with po cytotec. Pitocin started at 1015pm     Plan   - Place on peanut ball   - continue to monitor     d/w Dr. Ras Dean PGY-1

## 2021-04-15 NOTE — OB PROVIDER LABOR PROGRESS NOTE - ASSESSMENT
Patient is a 27yo  @38w2d admitted in early labor. For epidural for pain control and expectant management of spontaneous labor.    Bonita Niño, PGY3 Patient is a 27yo  @38w2d admitted in early labor v. PROM. Patient s/p 1 dose of 20 PO cytotec, however continued to contract too frequently for additional doses. Given advanced cervical dilation in absence of additional induction agents, likely spontaneous labor. For epidural for pain control and expectant management of spontaneous labor.    Bonita Niño, PGY3

## 2021-04-15 NOTE — CHART NOTE - NSCHARTNOTEFT_GEN_A_CORE
Patient feeling pressure  FHT category 1 now  VE 9.5/100/+1 feels LOP  reposition patient so vertex hopefully turns

## 2021-04-15 NOTE — OB NEONATOLOGY/PEDIATRICIAN DELIVERY SUMMARY - NSPEDSNEONOTESA_OBGYN_ALL_OB_FT
38.2wk male born to a 25 y/o  mother via . Peds called for Cat II. Maternal history uncomplicated. Pregnancy uncomplicated. Maternal blood type A+. Prenatal labs N/NR/I. GBS neg 3/31. SROM at 16:05 on , clear fluids. Baby born vigorous and crying spontaneously. APGARS 9/9. EOS 0.19. Mom plans to breast feed, wants Hep B, and wants circumcision prior to discharge.

## 2021-04-16 RX ORDER — IBUPROFEN 200 MG
600 TABLET ORAL EVERY 6 HOURS
Refills: 0 | Status: DISCONTINUED | OUTPATIENT
Start: 2021-04-16 | End: 2021-04-17

## 2021-04-16 RX ADMIN — SODIUM CHLORIDE 3 MILLILITER(S): 9 INJECTION INTRAMUSCULAR; INTRAVENOUS; SUBCUTANEOUS at 14:00

## 2021-04-16 RX ADMIN — Medication 600 MILLIGRAM(S): at 04:00

## 2021-04-16 RX ADMIN — Medication 1 TABLET(S): at 12:48

## 2021-04-16 RX ADMIN — Medication 600 MILLIGRAM(S): at 18:20

## 2021-04-16 RX ADMIN — Medication 975 MILLIGRAM(S): at 00:59

## 2021-04-16 RX ADMIN — Medication 600 MILLIGRAM(S): at 12:15

## 2021-04-16 RX ADMIN — SODIUM CHLORIDE 3 MILLILITER(S): 9 INJECTION INTRAMUSCULAR; INTRAVENOUS; SUBCUTANEOUS at 21:30

## 2021-04-16 RX ADMIN — Medication 600 MILLIGRAM(S): at 03:00

## 2021-04-16 RX ADMIN — Medication 600 MILLIGRAM(S): at 12:45

## 2021-04-16 RX ADMIN — Medication 600 MILLIGRAM(S): at 17:50

## 2021-04-16 NOTE — LACTATION INITIAL EVALUATION - INTERVENTION OUTCOME
nbn  sleepy  less than  24  hours  old  had  circ also. nbn shas  short  bursts  of  sucking  and  swallowing  .  offered assistance/verbalizes understanding

## 2021-04-16 NOTE — DISCHARGE NOTE OB - PATIENT PORTAL LINK FT
You can access the FollowMyHealth Patient Portal offered by [facility] by registering at the following website: [email or web address]. By joining [facility]’s FollowMyHealth portal, you will also be able to view your health information using other applications (apps) compatible with our system.

## 2021-04-16 NOTE — LACTATION INITIAL EVALUATION - LACTATION INTERVENTIONS
reviewed with  mother breastfeeding section  of  postpartum  book.   watched  latch  viseo/initiate skin to skin/initiate hand expression routine/reverse pressure softening/initiate/review techniques for position and latch/initiate/review breast massage/compression

## 2021-04-16 NOTE — DISCHARGE NOTE OB - CARE PROVIDER_API CALL
Tracie Mallory (MD)  Obstetrics and Gynecology  [facility] [place] [address]  Phone: [phone]  Fax: [phone]  Follow Up Time:

## 2021-04-16 NOTE — DISCHARGE NOTE OB - CARE PLAN
Principal Discharge DX:	Vaginal delivery  Goal:	complete recovery  Assessment and plan of treatment:	stable, routine postpartum care

## 2021-04-16 NOTE — PROGRESS NOTE ADULT - SUBJECTIVE AND OBJECTIVE BOX
S: Patient doing well. Minimal lochia. Pain controlled.    O: Vital Signs Last 24 Hrs  T(C): 36.6 (2021 05:38), Max: 37.1 (2021 01:45)  T(F): 97.9 (2021 05:38), Max: 98.7 (2021 01:45)  HR: 78 (2021 05:38) (65 - 118)  BP: 110/72 (2021 05:38) (100/61 - 173/90)  BP(mean): --  RR: 18 (2021 05:38) (18 - 18)  SpO2: 100% (2021 05:38) (68% - 100%)    Gen: NAD  Abd: soft, NT, ND, fundus firm below umbilicus  Lochia: moderate  Ext: no tenderness    Labs:                        11.7   7.72  )-----------( 244      ( 2021 21:30 )             35.9       A: 26y PPD# 1 s/p  doing well.    Plan: for discharge tomorrow

## 2021-04-16 NOTE — OB PROVIDER DELIVERY SUMMARY - NSPROVIDERDELIVERYNOTE_OBGYN_ALL_OB_FT
Spontaneous vaginal delivery of liveborn infant from OA position. Head, shoulders and body delivered without complication. Infant was passed to mom and suctioned. Cord was clamped and cut at 30 seconds of life. Placenta was delivered intact with a 3 vessel cord. Fundal massage was given and uterus was found to be firm. Vaginal exam revealed an intact cervix, vaginal walls and sulci. Patient had 2 second degree lacerations in the perineum which were repaired with 2.0 vicryl x3 and 3.0 vicryl x3. Excellent hemostasis was noted. The patient was stable. Count was correct x2.    Jessica Higgins PGY1

## 2021-04-16 NOTE — DISCHARGE NOTE OB - HOSPITAL COURSE
PROM at 38 weeks gestation, IOL with cytotec, resultant  4/15/21, male  Apgars 9,9, bilateral sulcus tears with repair of those and second degree perineal laceration

## 2021-04-17 VITALS
RESPIRATION RATE: 18 BRPM | TEMPERATURE: 98 F | HEART RATE: 100 BPM | DIASTOLIC BLOOD PRESSURE: 75 MMHG | OXYGEN SATURATION: 100 % | SYSTOLIC BLOOD PRESSURE: 110 MMHG

## 2021-04-17 RX ADMIN — SODIUM CHLORIDE 3 MILLILITER(S): 9 INJECTION INTRAMUSCULAR; INTRAVENOUS; SUBCUTANEOUS at 05:05

## 2021-04-17 RX ADMIN — Medication 600 MILLIGRAM(S): at 05:05

## 2021-04-17 RX ADMIN — Medication 600 MILLIGRAM(S): at 05:35

## 2021-04-17 NOTE — CHART NOTE - NSCHARTNOTEFT_GEN_A_CORE
Called  to see a patient with c/o SOB,  during discharge Vitals.    S/P  on 4/15.  EBL - 200  VSS, Afebrile  Discharge Vital Signs - 36.9100, 110/75, 18, 100%    Found patient ambulating in her room, in no apparent distress.  Offered no complaints at present.  Patient stated that she felt a bit SOB at that time and that it started this morning.  She felt that it was due to her not getting any rest, as she was Breastfeeding all night and did not sleep.    Fundus - Firm  Lochia - Mod - Min  LE - (+) Edema. No pain/tenderness/redness    Plan - Discussed with Dr. Mcginnis. Will do a Stat CBC now. Continue routine Postpartum care. Called  to see a patient with c/o SOB,  during discharge Vitals.    S/P  on 4/15.  EBL - 200  VSS, Afebrile  Discharge Vital Signs - 36.9100, 110/75, 18, 100%    Found patient ambulating in her room, in no apparent distress.  Offered no complaints at present.  Patient stated that she felt a bit SOB at that time and that it started this morning.  She felt that it was due to her not getting any rest, as she was Breastfeeding all night and did not sleep.    Fundus - Firm  Lochia - Mod - Min  LE - (+) Edema. No pain/tenderness/redness  Lungs - Clear    Plan - Discussed with Dr. Mcginnis. Will do a Stat CBC now. Continue routine Postpartum care.

## 2021-04-17 NOTE — PROVIDER CONTACT NOTE (OTHER) - RECOMMENDATIONS
NP aware. saw pt. at bedside. CBC ordered, Pt states feeling better, " does not want blood work" and just wants to go home to rest. Pt states understanding of S/S to call MD. NP and attending MD aware

## 2021-04-21 ENCOUNTER — APPOINTMENT (OUTPATIENT)
Dept: OBGYN | Facility: CLINIC | Age: 26
End: 2021-04-21

## 2021-04-21 VITALS — HEIGHT: 66 IN

## 2021-06-03 ENCOUNTER — APPOINTMENT (OUTPATIENT)
Dept: OBGYN | Facility: CLINIC | Age: 26
End: 2021-06-03
Payer: MEDICAID

## 2021-06-03 VITALS
TEMPERATURE: 97.2 F | HEART RATE: 67 BPM | SYSTOLIC BLOOD PRESSURE: 115 MMHG | HEIGHT: 66 IN | WEIGHT: 157 LBS | BODY MASS INDEX: 25.23 KG/M2 | DIASTOLIC BLOOD PRESSURE: 75 MMHG

## 2021-06-03 DIAGNOSIS — Z87.42 PERSONAL HISTORY OF OTHER DISEASES OF THE FEMALE GENITAL TRACT: ICD-10-CM

## 2021-06-03 DIAGNOSIS — O36.80X0 PREGNANCY WITH INCONCLUSIVE FETAL VIABILITY, NOT APPLICABLE OR UNSPECIFIED: ICD-10-CM

## 2021-06-03 DIAGNOSIS — Z34.03 ENCOUNTER FOR SUPERVISION OF NORMAL FIRST PREGNANCY, THIRD TRIMESTER: ICD-10-CM

## 2021-06-03 DIAGNOSIS — Z34.02 ENCOUNTER FOR SUPERVISION OF NORMAL FIRST PREGNANCY, SECOND TRIMESTER: ICD-10-CM

## 2021-06-03 PROCEDURE — 0503F POSTPARTUM CARE VISIT: CPT

## 2021-06-03 NOTE — HISTORY OF PRESENT ILLNESS
[Delivery Date: ___] : on [unfilled] [Male] : Delivery History: baby boy [Boy] : baby is a boy [Infant's Name ___] : [unfilled] [___ Lbs] : [unfilled] lbs [___ Oz] : [unfilled] oz [Circumcised] : circumcised [Living at Home] : is currently living at home [] : delivered by vaginal delivery [Breastfeeding] : currently nursing [Resumed Menses] : has not resumed her menses [Resumed Allgood] : has not resumed intercourse [Intended Contraception] : the patient does not intended to use contraception postpartum [None] : No associated symptoms are reported [Awake] : awake [Alert] : alert [Acute Distress] : no acute distress [Mass] : no breast mass [Nipple Discharge] : no nipple discharge [Axillary LAD] : no axillary lymphadenopathy [Soft] : soft [Tender] : non tender [Distended] : not distended [Oriented x3] : oriented to person, place, and time [Depressed Mood] : not depressed [Flat Affect] : affect not flat [No Lesions] : no genitalia lesions [Normal] : cervix [Labia Majora] : labia major [Labia Minora] : labia minora [Pink Rugae] : pink rugae [No Bleeding] : there was no active vaginal bleeding [Motion Tenderness] : there was no cervical motion tenderness [Tenderness] : nontender [Adnexa Tenderness] : were not tender [Doing Well] : is doing well [No Sign of Infection] : is showing no signs of infection [Excellent Pain Control] : has excellent pain control [FreeTextEntry8] : This 25 yo P 1001 presents post vaginal delivery for PPV; feels well, voiding and stooling without issue, will consider IUD vs other. [de-identified] : Nl PPV [de-identified] : IUD info in hand- will task Billing Dept for coverage; Kegel sheet in hand; RTO 3-4 months annual

## 2023-02-07 NOTE — OB RN PATIENT PROFILE - NS_ADMITROM_OBGYN_ALL_OB
Patient called requesting an appointment this week with Dr. Calloway for pain she has been having in her foot. I told her that Dr. Calloway is booked this week and she is wanting a call back to see if another provider or Dr. Calloway will still see her. Please follow up with the patient.  
Yes

## 2024-06-06 ENCOUNTER — ASOB RESULT (OUTPATIENT)
Age: 29
End: 2024-06-06

## 2024-06-06 ENCOUNTER — APPOINTMENT (OUTPATIENT)
Dept: OBGYN | Facility: CLINIC | Age: 29
End: 2024-06-06
Payer: MEDICAID

## 2024-06-06 VITALS
SYSTOLIC BLOOD PRESSURE: 119 MMHG | WEIGHT: 169 LBS | DIASTOLIC BLOOD PRESSURE: 81 MMHG | HEART RATE: 71 BPM | HEIGHT: 66 IN | BODY MASS INDEX: 27.16 KG/M2

## 2024-06-06 DIAGNOSIS — N91.2 AMENORRHEA, UNSPECIFIED: ICD-10-CM

## 2024-06-06 PROCEDURE — 76817 TRANSVAGINAL US OBSTETRIC: CPT

## 2024-06-06 PROCEDURE — 99203 OFFICE O/P NEW LOW 30 MIN: CPT

## 2024-06-07 NOTE — PHYSICAL EXAM
[Chaperone Present] : A chaperone was present in the examining room during all aspects of the physical examination [[id number]] : A chaperone was present during the pelvic exam. [Appropriately responsive] : appropriately responsive [Alert] : alert [No Acute Distress] : no acute distress [No Lymphadenopathy] : no lymphadenopathy [Regular Rate Rhythm] : regular rate rhythm [No Murmurs] : no murmurs [Clear to Auscultation B/L] : clear to auscultation bilaterally [Soft] : soft [Non-tender] : non-tender [Non-distended] : non-distended [No HSM] : No HSM [No Lesions] : no lesions [No Mass] : no mass [Oriented x3] : oriented x3 [Examination Of The Breasts] : a normal appearance [No Discharge] : no discharge [No Masses] : no breast masses were palpable [Labia Majora] : normal [Labia Minora] : normal [Normal] : normal [Uterine Adnexae] : normal [FreeTextEntry2] : EPI Chiang

## 2024-06-07 NOTE — PLAN
[FreeTextEntry1] : 29 year old P1 presents to establish care and confirm pregnancy.   Patient given practice letter and [facility] pregnancy brochure. The group's support staff and coverage arrangement discussed.   - -Reviewed prenatal screening including amino, CVS, NIPS, and nuchal. -Referral given for nuchal -NPN bloods to be done with nIPS  HCM: - Pap today
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
## 2024-06-07 NOTE — DISCUSSION/SUMMARY
[FreeTextEntry1] : This note was written by Samantha Cuellar on 06/06/2024 actively solely Dr. Tracie Mallory M.D.   All medical record entries made by this scribe at my, Dr. Tracie Mallory M.D. direction and personally dictated by me on 06/06/2024. I have personally reviewed the chart and agree that the record reflects my personal performance of the history, physical exam, assessment, and plan.

## 2024-06-07 NOTE — HISTORY OF PRESENT ILLNESS
[FreeTextEntry1] :  29 year old P1 LMP 4/9/24 presents to establish OBGYN care. She reports missed menses. US today 6/6/24 shows SLIUP 8 weeks 2 days, LUIS 1/14/24.   4cm left simple cyst visualized in sono.   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 Pt reports her maternal aunt passed away from breast cancer and another maternal aunt Dx with ovarian cancer.  Patient is happy about the pregnancy [Pregnancy History] : boy [___] : Delivery occurred at [unfilled] [LMPDate] : 4/9/24 [PGHxTotal] : 2 [[facility] Hospital[facility]] : 1 [[facility] Center[facility]] : 1

## 2024-06-14 LAB
BACTERIA UR CULT: NORMAL
C TRACH RRNA SPEC QL NAA+PROBE: NOT DETECTED
CYTOLOGY CVX/VAG DOC THIN PREP: NORMAL
HPV HIGH+LOW RISK DNA PNL CVX: NOT DETECTED
N GONORRHOEA RRNA SPEC QL NAA+PROBE: NOT DETECTED
SOURCE AMPLIFICATION: NORMAL

## 2024-07-10 ENCOUNTER — ASOB RESULT (OUTPATIENT)
Age: 29
End: 2024-07-10

## 2024-07-10 ENCOUNTER — APPOINTMENT (OUTPATIENT)
Dept: ANTEPARTUM | Facility: CLINIC | Age: 29
End: 2024-07-10
Payer: MEDICAID

## 2024-07-10 ENCOUNTER — APPOINTMENT (OUTPATIENT)
Dept: OBGYN | Facility: CLINIC | Age: 29
End: 2024-07-10
Payer: MEDICAID

## 2024-07-10 VITALS
WEIGHT: 165 LBS | BODY MASS INDEX: 26.52 KG/M2 | HEIGHT: 66 IN | SYSTOLIC BLOOD PRESSURE: 106 MMHG | HEART RATE: 72 BPM | DIASTOLIC BLOOD PRESSURE: 74 MMHG

## 2024-07-10 DIAGNOSIS — Z34.92 ENCOUNTER FOR SUPERVISION OF NORMAL PREGNANCY, UNSPECIFIED, SECOND TRIMESTER: ICD-10-CM

## 2024-07-10 LAB
ALBUMIN SERPL ELPH-MCNC: 4.1 G/DL
ALP BLD-CCNC: 42 U/L
ALT SERPL-CCNC: 32 U/L
ANION GAP SERPL CALC-SCNC: 13 MMOL/L
AST SERPL-CCNC: 20 U/L
BASOPHILS # BLD AUTO: 0.02 K/UL
BASOPHILS NFR BLD AUTO: 0.3 %
BILIRUB SERPL-MCNC: 0.3 MG/DL
BUN SERPL-MCNC: 8 MG/DL
CALCIUM SERPL-MCNC: 9 MG/DL
CHLORIDE SERPL-SCNC: 101 MMOL/L
CO2 SERPL-SCNC: 22 MMOL/L
CREAT SERPL-MCNC: 0.57 MG/DL
EGFR: 125 ML/MIN/1.73M2
EOSINOPHIL NFR BLD AUTO: 0.7 %
ESTIMATED AVERAGE GLUCOSE: 103 MG/DL
GLUCOSE SERPL-MCNC: 90 MG/DL
HBA1C MFR BLD HPLC: 5.2 %
HCT VFR BLD CALC: 32.7 %
HGB BLD-MCNC: 10.9 G/DL
IMM GRANULOCYTES NFR BLD AUTO: 0.3 %
LYMPHOCYTES # BLD AUTO: 1.59 K/UL
LYMPHOCYTES NFR BLD AUTO: 26.4 %
MCHC RBC-ENTMCNC: 27 PG
MCHC RBC-ENTMCNC: 33.3 GM/DL
MCV RBC AUTO: 80.9 FL
MONOCYTES # BLD AUTO: 0.27 K/UL
MONOCYTES NFR BLD AUTO: 4.5 %
NEUTROPHILS # BLD AUTO: 4.09 K/UL
NEUTROPHILS NFR BLD AUTO: 67.8 %
PLATELET # BLD AUTO: 268 K/UL
POTASSIUM SERPL-SCNC: 3.9 MMOL/L
PROT SERPL-MCNC: 6.9 G/DL
RBC # BLD: 4.04 M/UL
RBC # FLD: 12.9 %
SODIUM SERPL-SCNC: 137 MMOL/L
T PALLIDUM AB SER QL IA: NEGATIVE
TSH SERPL-ACNC: 1.67 UIU/ML
WBC # FLD AUTO: 6.03 K/UL

## 2024-07-10 PROCEDURE — 76813 OB US NUCHAL MEAS 1 GEST: CPT

## 2024-07-10 PROCEDURE — 76801 OB US < 14 WKS SINGLE FETUS: CPT | Mod: 59

## 2024-07-10 PROCEDURE — 99213 OFFICE O/P EST LOW 20 MIN: CPT | Mod: TH,25

## 2024-07-11 LAB
HBV SURFACE AG SER QL: NONREACTIVE
HCV AB SER QL: NONREACTIVE
HCV S/CO RATIO: 0.08 S/CO
HIV1+2 AB SPEC QL IA.RAPID: NONREACTIVE
MEV IGG FLD QL IA: 61.5 AU/ML
MEV IGG+IGM SER-IMP: POSITIVE
RUBV IGG FLD-ACNC: 10.3 INDEX
RUBV IGG SER-IMP: POSITIVE
T GONDII AB SER-IMP: NEGATIVE
T GONDII AB SER-IMP: NEGATIVE
T GONDII IGG SER QL: <3 IU/ML
T GONDII IGM SER QL: <3 AU/ML
VZV AB TITR SER: POSITIVE
VZV IGG SER IF-ACNC: 1895 INDEX

## 2024-07-12 LAB
ABO + RH PNL BLD: NORMAL
B19V IGG SER QL IA: 0.31 INDEX
B19V IGG+IGM SER-IMP: NEGATIVE
B19V IGG+IGM SER-IMP: NORMAL
B19V IGM FLD-ACNC: 0.19 INDEX
B19V IGM SER-ACNC: NEGATIVE
BLD GP AB SCN SERPL QL: NORMAL
HGB A MFR BLD: 97.5 %
HGB FRACT BLD-IMP: NORMAL
LEAD BLD-MCNC: <1 UG/DL

## 2024-07-18 DIAGNOSIS — O99.019 ANEMIA COMPLICATING PREGNANCY, UNSPECIFIED TRIMESTER: ICD-10-CM

## 2024-07-18 LAB
M TB IFN-G BLD-IMP: NEGATIVE
QUANTIFERON TB PLUS MITOGEN MINUS NIL: 3.25 IU/ML
QUANTIFERON TB PLUS NIL: 0.03 IU/ML
QUANTIFERON TB PLUS TB1 MINUS NIL: 0 IU/ML
QUANTIFERON TB PLUS TB2 MINUS NIL: 0 IU/ML

## 2024-07-18 RX ORDER — ASCORBIC ACID 500 MG
500 TABLET ORAL DAILY
Qty: 30 | Refills: 3 | Status: ACTIVE | COMMUNITY
Start: 2024-07-18 | End: 1900-01-01

## 2024-07-18 RX ORDER — FERROUS SULFATE 325(65) MG
325 (65 FE) TABLET ORAL DAILY
Qty: 30 | Refills: 3 | Status: ACTIVE | COMMUNITY
Start: 2024-07-18 | End: 1900-01-01

## 2024-07-26 ENCOUNTER — APPOINTMENT (OUTPATIENT)
Dept: OBGYN | Facility: CLINIC | Age: 29
End: 2024-07-26
Payer: MEDICAID

## 2024-07-26 PROCEDURE — 99211 OFF/OP EST MAY X REQ PHY/QHP: CPT

## 2024-08-08 ENCOUNTER — APPOINTMENT (OUTPATIENT)
Dept: OBGYN | Facility: CLINIC | Age: 29
End: 2024-08-08

## 2024-08-08 ENCOUNTER — NON-APPOINTMENT (OUTPATIENT)
Age: 29
End: 2024-08-08

## 2024-08-08 PROCEDURE — 99213 OFFICE O/P EST LOW 20 MIN: CPT | Mod: TH,25

## 2024-08-28 ENCOUNTER — APPOINTMENT (OUTPATIENT)
Dept: ANTEPARTUM | Facility: CLINIC | Age: 29
End: 2024-08-28

## 2024-08-28 ENCOUNTER — ASOB RESULT (OUTPATIENT)
Age: 29
End: 2024-08-28

## 2024-08-28 PROCEDURE — 76805 OB US >/= 14 WKS SNGL FETUS: CPT

## 2024-09-12 ENCOUNTER — NON-APPOINTMENT (OUTPATIENT)
Age: 29
End: 2024-09-12

## 2024-09-12 ENCOUNTER — APPOINTMENT (OUTPATIENT)
Dept: OBGYN | Facility: CLINIC | Age: 29
End: 2024-09-12

## 2024-09-12 VITALS
HEIGHT: 66 IN | HEART RATE: 76 BPM | DIASTOLIC BLOOD PRESSURE: 71 MMHG | WEIGHT: 170 LBS | SYSTOLIC BLOOD PRESSURE: 105 MMHG | BODY MASS INDEX: 27.32 KG/M2

## 2024-09-12 PROCEDURE — 99213 OFFICE O/P EST LOW 20 MIN: CPT | Mod: TH,25

## 2024-09-16 ENCOUNTER — TRANSCRIPTION ENCOUNTER (OUTPATIENT)
Age: 29
End: 2024-09-16

## 2024-09-17 ENCOUNTER — TRANSCRIPTION ENCOUNTER (OUTPATIENT)
Age: 29
End: 2024-09-17

## 2024-09-18 ENCOUNTER — TRANSCRIPTION ENCOUNTER (OUTPATIENT)
Age: 29
End: 2024-09-18

## 2024-10-16 ENCOUNTER — APPOINTMENT (OUTPATIENT)
Dept: ANTEPARTUM | Facility: CLINIC | Age: 29
End: 2024-10-16
Payer: MEDICAID

## 2024-10-16 ENCOUNTER — APPOINTMENT (OUTPATIENT)
Dept: OBGYN | Facility: CLINIC | Age: 29
End: 2024-10-16
Payer: MEDICAID

## 2024-10-16 ENCOUNTER — ASOB RESULT (OUTPATIENT)
Age: 29
End: 2024-10-16

## 2024-10-16 VITALS
WEIGHT: 173.1 LBS | DIASTOLIC BLOOD PRESSURE: 71 MMHG | BODY MASS INDEX: 27.82 KG/M2 | SYSTOLIC BLOOD PRESSURE: 107 MMHG | HEART RATE: 83 BPM | HEIGHT: 66 IN

## 2024-10-16 DIAGNOSIS — Z34.92 ENCOUNTER FOR SUPERVISION OF NORMAL PREGNANCY, UNSPECIFIED, SECOND TRIMESTER: ICD-10-CM

## 2024-10-16 PROCEDURE — 76816 OB US FOLLOW-UP PER FETUS: CPT

## 2024-10-16 PROCEDURE — 90471 IMMUNIZATION ADMIN: CPT

## 2024-10-16 PROCEDURE — 90682 RIV4 VACC RECOMBINANT DNA IM: CPT

## 2024-10-16 PROCEDURE — 99213 OFFICE O/P EST LOW 20 MIN: CPT | Mod: TH,25

## 2024-10-16 PROCEDURE — 76819 FETAL BIOPHYS PROFIL W/O NST: CPT

## 2024-10-18 ENCOUNTER — APPOINTMENT (OUTPATIENT)
Dept: ANTEPARTUM | Facility: CLINIC | Age: 29
End: 2024-10-18

## 2024-10-21 LAB
BASOPHILS # BLD AUTO: 0.02 K/UL
BASOPHILS NFR BLD AUTO: 0.3 %
EOSINOPHIL # BLD AUTO: 0.03 K/UL
EOSINOPHIL NFR BLD AUTO: 0.5 %
GLUCOSE 1H P 50 G GLC PO SERPL-MCNC: 97 MG/DL
HCT VFR BLD CALC: 31.7 %
HGB BLD-MCNC: 10.4 G/DL
IMM GRANULOCYTES NFR BLD AUTO: 0.5 %
LYMPHOCYTES # BLD AUTO: 1.13 K/UL
LYMPHOCYTES NFR BLD AUTO: 18.4 %
MAN DIFF?: NORMAL
MCHC RBC-ENTMCNC: 26.9 PG
MCHC RBC-ENTMCNC: 32.8 GM/DL
MCV RBC AUTO: 81.9 FL
MONOCYTES # BLD AUTO: 0.32 K/UL
MONOCYTES NFR BLD AUTO: 5.2 %
NEUTROPHILS # BLD AUTO: 4.61 K/UL
NEUTROPHILS NFR BLD AUTO: 75.1 %
PLATELET # BLD AUTO: 262 K/UL
RBC # BLD: 3.87 M/UL
RBC # FLD: 14 %
WBC # FLD AUTO: 6.14 K/UL

## 2024-11-07 ENCOUNTER — NON-APPOINTMENT (OUTPATIENT)
Age: 29
End: 2024-11-07

## 2024-11-07 ENCOUNTER — APPOINTMENT (OUTPATIENT)
Dept: OBGYN | Facility: CLINIC | Age: 29
End: 2024-11-07
Payer: MEDICAID

## 2024-11-07 VITALS
WEIGHT: 182 LBS | HEART RATE: 91 BPM | HEIGHT: 66 IN | DIASTOLIC BLOOD PRESSURE: 68 MMHG | BODY MASS INDEX: 29.25 KG/M2 | SYSTOLIC BLOOD PRESSURE: 105 MMHG

## 2024-11-07 DIAGNOSIS — Z34.93 ENCOUNTER FOR SUPERVISION OF NORMAL PREGNANCY, UNSPECIFIED, THIRD TRIMESTER: ICD-10-CM

## 2024-11-07 PROCEDURE — 99213 OFFICE O/P EST LOW 20 MIN: CPT | Mod: TH

## 2024-11-20 ENCOUNTER — APPOINTMENT (OUTPATIENT)
Dept: ANTEPARTUM | Facility: CLINIC | Age: 29
End: 2024-11-20
Payer: MEDICAID

## 2024-11-20 ENCOUNTER — ASOB RESULT (OUTPATIENT)
Age: 29
End: 2024-11-20

## 2024-11-20 PROCEDURE — 76816 OB US FOLLOW-UP PER FETUS: CPT

## 2024-11-21 ENCOUNTER — NON-APPOINTMENT (OUTPATIENT)
Age: 29
End: 2024-11-21

## 2024-11-21 ENCOUNTER — APPOINTMENT (OUTPATIENT)
Dept: OBGYN | Facility: CLINIC | Age: 29
End: 2024-11-21
Payer: MEDICAID

## 2024-11-21 VITALS
WEIGHT: 184 LBS | BODY MASS INDEX: 29.57 KG/M2 | HEIGHT: 66 IN | HEART RATE: 86 BPM | SYSTOLIC BLOOD PRESSURE: 111 MMHG | DIASTOLIC BLOOD PRESSURE: 75 MMHG

## 2024-11-21 DIAGNOSIS — Z34.93 ENCOUNTER FOR SUPERVISION OF NORMAL PREGNANCY, UNSPECIFIED, THIRD TRIMESTER: ICD-10-CM

## 2024-11-21 PROCEDURE — 99213 OFFICE O/P EST LOW 20 MIN: CPT | Mod: TH

## 2024-12-03 NOTE — OB HISTORY
Quality 226: Preventive Care And Screening: Tobacco Use: Screening And Cessation Intervention: Patient screened for tobacco use and is an ex/non-smoker Detail Level: Detailed [___] : Total Pregnancies: [unfilled]

## 2024-12-04 ENCOUNTER — NON-APPOINTMENT (OUTPATIENT)
Age: 29
End: 2024-12-04

## 2024-12-04 ENCOUNTER — APPOINTMENT (OUTPATIENT)
Dept: OBGYN | Facility: CLINIC | Age: 29
End: 2024-12-04
Payer: MEDICAID

## 2024-12-04 VITALS
SYSTOLIC BLOOD PRESSURE: 103 MMHG | HEIGHT: 66 IN | HEART RATE: 92 BPM | WEIGHT: 187 LBS | BODY MASS INDEX: 30.05 KG/M2 | DIASTOLIC BLOOD PRESSURE: 70 MMHG

## 2024-12-04 DIAGNOSIS — Z34.93 ENCOUNTER FOR SUPERVISION OF NORMAL PREGNANCY, UNSPECIFIED, THIRD TRIMESTER: ICD-10-CM

## 2024-12-04 PROCEDURE — 90380 RSV MONOC ANTB SEASN .5ML IM: CPT

## 2024-12-04 PROCEDURE — 96372 THER/PROPH/DIAG INJ SC/IM: CPT

## 2024-12-04 PROCEDURE — 99213 OFFICE O/P EST LOW 20 MIN: CPT | Mod: TH,25

## 2024-12-16 ENCOUNTER — APPOINTMENT (OUTPATIENT)
Dept: OBGYN | Facility: CLINIC | Age: 29
End: 2024-12-16
Payer: MEDICAID

## 2024-12-16 VITALS
HEART RATE: 81 BPM | HEIGHT: 66 IN | WEIGHT: 189 LBS | SYSTOLIC BLOOD PRESSURE: 109 MMHG | BODY MASS INDEX: 30.37 KG/M2 | DIASTOLIC BLOOD PRESSURE: 68 MMHG

## 2024-12-16 PROCEDURE — 99213 OFFICE O/P EST LOW 20 MIN: CPT | Mod: TH

## 2024-12-17 ENCOUNTER — APPOINTMENT (OUTPATIENT)
Dept: OBGYN | Facility: CLINIC | Age: 29
End: 2024-12-17

## 2024-12-18 LAB
GP B STREP DNA SPEC QL NAA+PROBE: NOT DETECTED
SOURCE GBS: NORMAL

## 2024-12-19 ENCOUNTER — APPOINTMENT (OUTPATIENT)
Dept: ANTEPARTUM | Facility: CLINIC | Age: 29
End: 2024-12-19
Payer: MEDICAID

## 2024-12-19 ENCOUNTER — ASOB RESULT (OUTPATIENT)
Age: 29
End: 2024-12-19

## 2024-12-19 PROCEDURE — 76819 FETAL BIOPHYS PROFIL W/O NST: CPT

## 2024-12-19 PROCEDURE — 76816 OB US FOLLOW-UP PER FETUS: CPT

## 2024-12-23 ENCOUNTER — NON-APPOINTMENT (OUTPATIENT)
Age: 29
End: 2024-12-23

## 2024-12-23 ENCOUNTER — APPOINTMENT (OUTPATIENT)
Dept: OBGYN | Facility: CLINIC | Age: 29
End: 2024-12-23
Payer: MEDICAID

## 2024-12-23 VITALS
DIASTOLIC BLOOD PRESSURE: 80 MMHG | HEIGHT: 66 IN | BODY MASS INDEX: 30.22 KG/M2 | WEIGHT: 188 LBS | SYSTOLIC BLOOD PRESSURE: 117 MMHG | HEART RATE: 89 BPM

## 2024-12-23 PROCEDURE — 99213 OFFICE O/P EST LOW 20 MIN: CPT | Mod: TH,25

## 2024-12-24 PROBLEM — F10.90 ALCOHOL USE: Status: INACTIVE | Noted: 2020-06-22

## 2024-12-24 LAB
HIV1+2 AB SPEC QL IA.RAPID: NONREACTIVE
T PALLIDUM AB SER QL IA: NEGATIVE

## 2024-12-31 ENCOUNTER — APPOINTMENT (OUTPATIENT)
Dept: ANTEPARTUM | Facility: CLINIC | Age: 29
End: 2024-12-31

## 2025-01-02 ENCOUNTER — NON-APPOINTMENT (OUTPATIENT)
Age: 30
End: 2025-01-02

## 2025-01-02 ENCOUNTER — APPOINTMENT (OUTPATIENT)
Dept: OBGYN | Facility: CLINIC | Age: 30
End: 2025-01-02
Payer: MEDICAID

## 2025-01-02 VITALS
HEART RATE: 83 BPM | SYSTOLIC BLOOD PRESSURE: 122 MMHG | DIASTOLIC BLOOD PRESSURE: 77 MMHG | BODY MASS INDEX: 31.5 KG/M2 | WEIGHT: 196 LBS | HEIGHT: 66 IN

## 2025-01-02 PROCEDURE — 99213 OFFICE O/P EST LOW 20 MIN: CPT | Mod: TH

## 2025-01-06 ENCOUNTER — APPOINTMENT (OUTPATIENT)
Dept: OBGYN | Facility: CLINIC | Age: 30
End: 2025-01-06
Payer: MEDICAID

## 2025-01-06 VITALS
WEIGHT: 194 LBS | DIASTOLIC BLOOD PRESSURE: 87 MMHG | BODY MASS INDEX: 31.18 KG/M2 | HEIGHT: 66 IN | SYSTOLIC BLOOD PRESSURE: 135 MMHG | HEART RATE: 113 BPM

## 2025-01-06 PROCEDURE — 99213 OFFICE O/P EST LOW 20 MIN: CPT | Mod: TH

## 2025-01-13 ENCOUNTER — APPOINTMENT (OUTPATIENT)
Dept: OBGYN | Facility: CLINIC | Age: 30
End: 2025-01-13
Payer: MEDICAID

## 2025-01-13 VITALS
HEART RATE: 79 BPM | DIASTOLIC BLOOD PRESSURE: 78 MMHG | SYSTOLIC BLOOD PRESSURE: 117 MMHG | WEIGHT: 195 LBS | BODY MASS INDEX: 31.34 KG/M2 | HEIGHT: 66 IN

## 2025-01-13 PROCEDURE — 99213 OFFICE O/P EST LOW 20 MIN: CPT | Mod: TH

## 2025-01-14 ENCOUNTER — ASOB RESULT (OUTPATIENT)
Age: 30
End: 2025-01-14

## 2025-01-14 ENCOUNTER — APPOINTMENT (OUTPATIENT)
Dept: ANTEPARTUM | Facility: CLINIC | Age: 30
End: 2025-01-14
Payer: MEDICAID

## 2025-01-14 PROCEDURE — 76816 OB US FOLLOW-UP PER FETUS: CPT

## 2025-01-14 PROCEDURE — 76818 FETAL BIOPHYS PROFILE W/NST: CPT

## 2025-01-16 ENCOUNTER — NON-APPOINTMENT (OUTPATIENT)
Age: 30
End: 2025-01-16

## 2025-01-16 ENCOUNTER — APPOINTMENT (OUTPATIENT)
Dept: OBGYN | Facility: CLINIC | Age: 30
End: 2025-01-16
Payer: MEDICAID

## 2025-01-16 VITALS
HEIGHT: 66 IN | SYSTOLIC BLOOD PRESSURE: 119 MMHG | WEIGHT: 198 LBS | HEART RATE: 88 BPM | DIASTOLIC BLOOD PRESSURE: 81 MMHG | BODY MASS INDEX: 31.82 KG/M2

## 2025-01-16 DIAGNOSIS — Z34.93 ENCOUNTER FOR SUPERVISION OF NORMAL PREGNANCY, UNSPECIFIED, THIRD TRIMESTER: ICD-10-CM

## 2025-01-16 PROCEDURE — 99213 OFFICE O/P EST LOW 20 MIN: CPT | Mod: TH

## 2025-01-17 ENCOUNTER — INPATIENT (INPATIENT)
Facility: HOSPITAL | Age: 30
LOS: 2 days | Discharge: HOME | End: 2025-01-20
Attending: OBSTETRICS & GYNECOLOGY | Admitting: OBSTETRICS & GYNECOLOGY
Payer: MEDICAID

## 2025-01-17 ENCOUNTER — APPOINTMENT (OUTPATIENT)
Dept: ANTEPARTUM | Facility: CLINIC | Age: 30
End: 2025-01-17
Payer: MEDICAID

## 2025-01-17 ENCOUNTER — ASOB RESULT (OUTPATIENT)
Age: 30
End: 2025-01-17

## 2025-01-17 VITALS — DIASTOLIC BLOOD PRESSURE: 72 MMHG | SYSTOLIC BLOOD PRESSURE: 108 MMHG

## 2025-01-17 DIAGNOSIS — O36.8390 MATERNAL CARE FOR ABNORMALITIES OF THE FETAL HEART RATE OR RHYTHM, UNSPECIFIED TRIMESTER, NOT APPLICABLE OR UNSPECIFIED: ICD-10-CM

## 2025-01-17 LAB
BASOPHILS # BLD AUTO: 0.02 K/UL — SIGNIFICANT CHANGE UP (ref 0–0.2)
BASOPHILS NFR BLD AUTO: 0.3 % — SIGNIFICANT CHANGE UP (ref 0–2)
BLD GP AB SCN SERPL QL: NEGATIVE — SIGNIFICANT CHANGE UP
EOSINOPHIL # BLD AUTO: 0.03 K/UL — SIGNIFICANT CHANGE UP (ref 0–0.5)
EOSINOPHIL NFR BLD AUTO: 0.4 % — SIGNIFICANT CHANGE UP (ref 0–6)
HCT VFR BLD CALC: 33.7 % — LOW (ref 34.5–45)
HGB BLD-MCNC: 11.3 G/DL — LOW (ref 11.5–15.5)
IANC: 4.98 K/UL — SIGNIFICANT CHANGE UP (ref 1.8–7.4)
IMM GRANULOCYTES NFR BLD AUTO: 0.7 % — SIGNIFICANT CHANGE UP (ref 0–0.9)
LYMPHOCYTES # BLD AUTO: 1.42 K/UL — SIGNIFICANT CHANGE UP (ref 1–3.3)
LYMPHOCYTES # BLD AUTO: 20.7 % — SIGNIFICANT CHANGE UP (ref 13–44)
MCHC RBC-ENTMCNC: 27.4 PG — SIGNIFICANT CHANGE UP (ref 27–34)
MCHC RBC-ENTMCNC: 33.5 G/DL — SIGNIFICANT CHANGE UP (ref 32–36)
MCV RBC AUTO: 81.8 FL — SIGNIFICANT CHANGE UP (ref 80–100)
MONOCYTES # BLD AUTO: 0.35 K/UL — SIGNIFICANT CHANGE UP (ref 0–0.9)
MONOCYTES NFR BLD AUTO: 5.1 % — SIGNIFICANT CHANGE UP (ref 2–14)
NEUTROPHILS # BLD AUTO: 4.98 K/UL — SIGNIFICANT CHANGE UP (ref 1.8–7.4)
NEUTROPHILS NFR BLD AUTO: 72.8 % — SIGNIFICANT CHANGE UP (ref 43–77)
NRBC # BLD AUTO: 0 K/UL — SIGNIFICANT CHANGE UP (ref 0–0)
NRBC # BLD: 0 /100 WBCS — SIGNIFICANT CHANGE UP (ref 0–0)
NRBC # FLD: 0 K/UL — SIGNIFICANT CHANGE UP (ref 0–0)
NRBC BLD-RTO: 0 /100 WBCS — SIGNIFICANT CHANGE UP (ref 0–0)
PLATELET # BLD AUTO: 221 K/UL — SIGNIFICANT CHANGE UP (ref 150–400)
RBC # BLD: 4.12 M/UL — SIGNIFICANT CHANGE UP (ref 3.8–5.2)
RBC # FLD: 14.3 % — SIGNIFICANT CHANGE UP (ref 10.3–14.5)
RH IG SCN BLD-IMP: POSITIVE — SIGNIFICANT CHANGE UP
WBC # BLD: 6.85 K/UL — SIGNIFICANT CHANGE UP (ref 3.8–10.5)
WBC # FLD AUTO: 6.85 K/UL — SIGNIFICANT CHANGE UP (ref 3.8–10.5)

## 2025-01-17 PROCEDURE — 59412 ANTEPARTUM MANIPULATION: CPT

## 2025-01-17 PROCEDURE — 76818 FETAL BIOPHYS PROFILE W/NST: CPT

## 2025-01-17 RX ORDER — ANTISEPTIC SURGICAL SCRUB 0.04 MG/ML
1 SOLUTION TOPICAL DAILY
Refills: 0 | Status: DISCONTINUED | OUTPATIENT
Start: 2025-01-17 | End: 2025-01-18

## 2025-01-17 RX ORDER — OXYTOCIN/0.9 % SODIUM CHLORIDE 10/500ML
167 PLASTIC BAG, INJECTION (ML) INTRAVENOUS
Qty: 30 | Refills: 0 | Status: DISCONTINUED | OUTPATIENT
Start: 2025-01-17 | End: 2025-01-19

## 2025-01-17 RX ORDER — SODIUM CHLORIDE 9 G/ML
1000 INJECTION, SOLUTION INTRAVENOUS
Refills: 0 | Status: DISCONTINUED | OUTPATIENT
Start: 2025-01-17 | End: 2025-01-18

## 2025-01-17 RX ORDER — SODIUM CITRATE AND CITRIC ACID MONOHYDRATE 1002; 1500 MG/15ML; MG/15ML
15 SOLUTION ORAL EVERY 6 HOURS
Refills: 0 | Status: DISCONTINUED | OUTPATIENT
Start: 2025-01-17 | End: 2025-01-18

## 2025-01-17 RX ADMIN — ANTISEPTIC SURGICAL SCRUB 1 APPLICATION(S): 0.04 SOLUTION TOPICAL at 15:20

## 2025-01-17 RX ADMIN — SODIUM CHLORIDE 125 MILLILITER(S): 9 INJECTION, SOLUTION INTRAVENOUS at 15:56

## 2025-01-17 NOTE — OB PROVIDER H&P - NS_OBGYNHISTORY_OBGYN_ALL_OB_FT
Left ovarian cyst resolved  anemia in pregnancy   4/15/2021 FT M 6#11 Heart palpitations during pregnancy, cleared by cardiology

## 2025-01-17 NOTE — OB RN TRIAGE NOTE - INTERNATIONAL TRAVEL
DATE OF SURGERY:  10/22/2020     SURGEON:  Guillermo Deng DO    PREOPERATIVE DIAGNOSIS:  right hip osteoarthritis.    POSTOPERATIVE DIAGNOSIS: right hip osteoarthritis.    PROCEDURE:  right total hip arthroplasty.    ANESTHESIA:   Regional with fascia illiacus block  Dr. Suárez.    ASSISTANT:  Noe Quintanilla SA who was present for the  entire case and the patient entering the room to the patient, leaving the  room, presence required for patient positioning, prepping and draping,  exposure, procedure, implantation, and closure.     FINDINGS:  Degenerative joint disease of the right hip.    A stable right total hip arthroplasty was placed under   direct anterior approach.     ESTIMATED BLOOD LOSS:  420 mL.    IV FLUIDS:  1600 ml.    IMPLANTS:    1. DePuy Porocoat acetabular shell, 54 mm outer diameter.  2. Hampton AltrX polyethylene acetabular liner neutral 36 mm inner diameter,  for 54 mm outer diameter.   3. Biolox Delta ceramic femoral head 36 mm, +5.  4. Femoral stem Actis DuoFix hip prosthesis cementless standard offset size 4 with  collar.     DRAINS:  None.    SPECIMENS:  Bone to pathology.    COMPLICATIONS:  None apparent.    DISPOSITION:  Recovery room in stable condition.    OPERATIVE INDICATIONS:  The patient is a 46 year old year-old female, who is known  [facility] with a history of osteoarthritis of the  right hip.  After the failure of numerous conservative treatment options she  was indicated for surgical intervention with the right total hip  arthroplasty.  We discussed the risks, benefits, complications, and expected  outcomes in detailed language  could understand.  The patient would need  preoperative medical clearance, laboratory testing, and undergo the procedure  on 10/22/2020 .     OPERATIVE RECORD:  The patient was identified in the preoperative holding  area.  The patient's right lower extremity was signed with indelible marker  indicating operative limb.  The patient's  consent was signed and cosigned by  myself the patient prior to surgery.  The patient did receive infectious  prophylaxis within 1 hour prior to skin incision and allowed 20-30 minutes  infusion.  Next, the patient was taken to the operative suite in supine  fashion without complications.  Once in the operating room, the patient was  placed on operative table and underwent spinal anesthesia following a fascia  iliaca block in the right lower extremity.  Once the anesthesia reached this  level, patient was properly positioned on the operative table, all bony  prominences well padded and both lower extremities were prepped and draped in  a standard fashion.  Next, a formal time-out was performed with the entire  operative team in agreement.  Next, a skin incision was planned out 2  fingerbreadths distal to the anterior suprailiac spine, took a 5  fingerbreadths lateral for 4-5 fingerbreadth length incision.  C-arm  fluoroscopy was brought in to identify the incision over the hip.  Once  confirmed, a skin incision was made with #10 blade.  The incision was carried  through the dermis into the subcutaneous tissues.  We identified the  underlying tensor fascia.  This was carefully incised in line with the skin  incision.  We then placed deep retractors and identified the anterior  vessels, lateral femoral circumflex artery and vein.  These were then  coagulated with Bovie electrocautery.  We then went ahead and then released  the deep fascial layer, and then bluntly dissected superior and inferior to  the hip.  Appropriate retractors were placed.  We then exposed the capsule  with a De Jesus elevator and placed an anterior acetabular retractor and  performed a capsulotomy in the style of letter H.  With retractors  repositioned intra-articularly we performed a femoral neck osteotomy at a  45-degree angle long axis of the femur.  Once completed we placed a corkscrew  into the femoral head and removed the femoral head and  neck.  We then  repositioned the retractors extra-articularly and performed femoral releases.  The inferior capsule was released into the lesser trochanter, was digitally  palpated.  The superior capsule was released into the piriformis fossa  allowing the femur to elevate anterolaterally.  Portions of the capsule were  tagged with suture for later repair.  With the femur mobilized and placed an   anterior acetabular retractor, a posterior acetabular retractor with   acetabular pulvinar and labrum.  I then began reaming with a 51 mm and 53 mm acetabular   reamer. I medialized and increased the periphery to engage good bleeding cancellous  bone.  We then trialed a 54 shell found this to be appropriate.  We then  impacted a 54 mm porous-coated acetabular shell in about 40-45 degrees of  inclination about 20 to 25 degrees of anteversion.  This was impacted until  it bottomed out.  We then brought in C-arm fluoroscopy and utilized joint  point navigation, identified the position of the cup within the ranges  mentioned.  We then placed an acetabular liner for 36 mm inner diameter.  This was impacted, checked, and found to be stable.  We then focused  attention to the femur.  The femur was elevated anterolaterally and placed a  trochanteric retractor with a medial calcar retractor.  The right lower  extremity adduction and external rotation.  We then used a straight rasp to  enter the femoral canal.  Then a curved rasp we began broaching with a  starter broach all the way up to a size 4.  The size 4 had good rotational  stability.  We then used a calcar planer, started with a standard offset with  a 36 head with +5 neck length. The hip was reduced, taken through range of  motion found be stable in all planes including extension, external rotation,  figure-of-four, flexion and rotation.  C-arm fluoroscopy was brought in and  then utilizing the joint point navigation were able to confirm the vertical  and horizontal  offsets.  Once confirmed, the hip was dislocated, trials  removed.  Femur was elevated, retractors were placed and the right lower  extremity was placed into adduction and external rotation.  We then removed the  size 4 broach and placed a size 4 standard offset, impacted until it bottomed  out.  We then cleaned and dried the 12/14 taper, placed the 36 mm ceramic  head with a +5 sleeve.  This was impacted, pulled, and found to be stable.  We then reduced the hip, through good range of motion found it to be stable  in all planes including extension, external rotation, figure-of-four, flexion  and rotation, leg lengths found to be equal.  We then went ahead and brought  in C-arm fluoroscopy and final x-rays.  We then thoroughly irrigated the hip  with chlorhexidene infused normal saline, injected periarticular pain cocktail,  performed closure of layers.  The tensor fascia was closed with #2 Stratafix,  subcuticular was closed with 2-0 Stratafix.  Skin incision was then closed  with a 4-0 running Monocryl.  The incision was cleansed and dried and skin  glue was applied.  Once skin glue dried, sterile dressing was applied.  Next,  all drapes removed.  The patient awakened from residual anesthesia,  transferred from the operative field to the hospital bed to recovery room,  where he was found to have tolerated the procedure.  Please note, sponge  counts and needle counts were correct.  I was present for the entire case  from the patient entering the room to leaving the room.  A formal time-out  was performed prior to skin incision.  Infection prophylaxis was provided.     DISPOSITION:  The patient will be admitted to the hospital for medical  management, physical and occupational therapy, DVT prophylaxis pain control,  and appropriate discharge planning.  We will monitor the patient throughout  the hospital stay.       Guillermo Deng DO  10/22/2020  3:10 PM     No

## 2025-01-17 NOTE — OB RN PATIENT PROFILE - HEIGHT IN CM
Fortunately there was no blood in the stool in your blood counts here (two separate draws) are normal, so the blood draw yesterday at dialysis was a lab error. For the groin, clean the area at least twice a day with soap and water or at least wet wipes, then dry the area thoroughly with a towel followed by a hair dryer on the area for a few minutes, then apply clotrimazole cream twice a day. 167.64

## 2025-01-17 NOTE — OB RN PATIENT PROFILE - FALL HARM RISK - FALL HARM RISK
Bed: 17  Expected date:   Expected time:   Means of arrival: Self  Comments:   No indicators present

## 2025-01-17 NOTE — OB PROVIDER H&P - NSLOWPPHRISK_OBGYN_A_OB
No previous uterine incision/Burns Pregnancy/Less than or equal to 4 previous vaginal births/No known bleeding disorder/No history of postpartum hemorrhage/No other PPH risks indicated

## 2025-01-17 NOTE — CHART NOTE - NSCHARTNOTEFT_GEN_A_CORE
ECV Note    Risks of external cephalic version including risk of failure (40-50%), risk of reverting to malpresentation during the labor process, and risk of emergency  section (<1% in the setting of fetal bradycardia or placental abruption) were reviewed. All questions were answered. Consents were signed and witnessed.    The fetus was in a transverse presentation. Under ultrasound guidance, the fetus was turned in a counterclockwise direction. Fetal heart rate was reassuring throughout the procedure. At the end of the procedure, the fetus was in a cephalic presentation.    Jacquelyn Liang MD  [facility] Fellow  Attg: Dr. Rodriguez ECV Note    Risks of external cephalic version including risk of failure (40-50%), risk of reverting to malpresentation during the labor process, and risk of emergency  section (<1% in the setting of fetal bradycardia or placental abruption) were reviewed. All questions were answered. Consents were signed and witnessed.    The fetus was in a transverse presentation. Under ultrasound guidance, the fetus was turned in a counterclockwise direction. Fetal heart rate was reassuring throughout the procedure. At the end of the procedure, the fetus was in a cephalic presentation.    Jacquelyn Liang MD  [facility] Fellow  Attg: Dr. Rodriguez    [facility] Attending:   Agree with the above. I was present during the entire procedure and counseling prior to the procedure.   LINDA Rodriguez MD

## 2025-01-17 NOTE — OB PROVIDER H&P - NS_STATION_OBGYN_ALL_OB_NU
Detail Level: Detailed Quality 130: Documentation Of Current Medications In The Medical Record: Current Medications Documented -3

## 2025-01-17 NOTE — OB RN TRIAGE NOTE - FALL HARM RISK - UNIVERSAL INTERVENTIONS
Bed in lowest position, wheels locked, appropriate side rails in place/Call bell, personal items and telephone in reach/Instruct patient to call for assistance before getting out of bed or chair/Non-slip footwear when patient is out of bed/[place] to call system/Physically safe environment - no spills, clutter or unnecessary equipment/Purposeful Proactive Rounding/Room/bathroom lighting operational, light cord in reach

## 2025-01-17 NOTE — OB PROVIDER H&P - NSHPPHYSICALEXAM_GEN_ALL_CORE
Objective  – Vital Signs  Vital Signs Last 24 Hrs  T(C): 36.5 (17 Jan 2025 15:02), Max: 36.5 (17 Jan 2025 15:02)  T(F): 97.7 (17 Jan 2025 15:02), Max: 97.7 (17 Jan 2025 15:02)  HR: 78 (17 Jan 2025 15:22) (76 - 94)  BP: 108/72 (17 Jan 2025 15:02) (108/72 - 108/72)  BP(mean): --  RR: 18 (17 Jan 2025 15:02) (18 - 18)  SpO2: 100% (17 Jan 2025 15:22) (99% - 100%)    Parameters below as of 17 Jan 2025 15:02  Patient On (Oxygen Delivery Method): room air      – PE:   CV: RRR  Pulm: breathing comfortably on RA  Abd: gravid, nontender  Extr: moving all extremities with ease  – VE: 0/0/-3  – FHT: baseline 140, mod variability, +accels, -decels  – [place]: rare  – EFW: 3675g by sono  – Sono: vertex Objective  – Vital Signs  Vital Signs Last 24 Hrs  T(C): 36.5 (17 Jan 2025 15:02), Max: 36.5 (17 Jan 2025 15:02)  T(F): 97.7 (17 Jan 2025 15:02), Max: 97.7 (17 Jan 2025 15:02)  HR: 78 (17 Jan 2025 15:22) (76 - 94)  BP: 108/72 (17 Jan 2025 15:02) (108/72 - 108/72)  BP(mean): --  RR: 18 (17 Jan 2025 15:02) (18 - 18)  SpO2: 100% (17 Jan 2025 15:22) (99% - 100%)    Parameters below as of 17 Jan 2025 15:02  Patient On (Oxygen Delivery Method): room air      – PE:   CV: RRR  Pulm: breathing comfortably on RA  Abd: gravid, nontender  Extr: moving all extremities with ease  – VE: 0/0/-3  – FHT: baseline 140, mod variability, +accels, -decels  – [place]: rare  – EFW: 3675g by sono  – Sono: oblique, head slightly off center to maternal right

## 2025-01-17 NOTE — OB PROVIDER H&P - HISTORY OF PRESENT ILLNESS
Admission H&P    Subjective  HPI: 29 yoF  at 40w3d gestational age presents for delivery 2/2 nonreactive NST during monitoring. +FM. -LOF. -CTXs. -VB. Pt denies any other concerns.    – PNC: Denies prenatal issues. GBS negative.  EFW 3675g by brayden on   – OBHx:     in , 6#11, uncomplicated  – GynHx: hx of ovarian cyst which self resolved   – PMH: mild anemia requiring PO iron   – PSH: denies  – Psych: denies   – Social: denies   – Meds: PNV, Fe   – Allergies: NKDA

## 2025-01-17 NOTE — OB PROVIDER H&P - ASSESSMENT
30 yo P0 at 40w3d GA who is admitted for IOL 2/2 NRNST at 40w3d GA. Prenatal care remarkable for unstable fetal lie. Prenatal course otherwise uncomplicated.     Plan  1. Admit to LND. Routine Labs. IVF.  2. IOL with vaginal cytotec   3. Fetus: cat 1 tracing. VTX. EFW 3675g by sono. Continuous EFM. Sono. No concerns.  4. Prenatal issues: unstable lie. Documented as transverse at M BPP today but slightly oblique upon presentation to L&D. Fetal head moved to vertex. Will place belly band to hold fetal station and AROM as soon as feasible.   5. GBS _  6. Pain: IV pain meds/epidural PRN    Patient discussed with attending physician,  .    Carlotta Cody MD PGY4 28 yo P0 at 40w3d GA who is admitted for IOL 2/2 NRNST at 40w3d GA. Prenatal care remarkable for unstable fetal lie. Sent in from [facility] with transverse presentation but minimally oblique on initial sono during admission interview. Sono repeated 30 mins later for presentation with attending at bedside and fetal head noted to be more oblique to transverse in position. Patient counseled on ECV, which she was amenable to, and was performed successfully by [facility]. Fetus now in vertex presentation with abdominal binder in place and patient sitting up in high fowlers position.     Plan  1. Admit to LND. Routine Labs. IVF.  2. IOL with vaginal cytotec. Early AROM and transition to pitocin   - sono q shift for presentation   - NO CERVICAL RIPENING BALLOON   3. Fetus: cat 1 tracing. VTX but unstable lie. EFW 3675g by sono. Continuous EFM. Sono. No concerns.  4. Prenatal issues: unstable lie s/p ECV with fetus currently in vertex presentation   5. GBS neg  6. Pain: Early epidural given unstable lie. 20 minute NST after ECV -> call for epidural     Patient seen and evaluated with atttending physician, Dr. Yi.    Carlotta Cody MD PGY4

## 2025-01-17 NOTE — OB RN PATIENT PROFILE - AS TEMP SITE
oral Cheiloplasty (Complex) Text: A decision was made to reconstruct the defect with a  cheiloplasty.  The defect was undermined extensively.  Additional orbicularis oris muscle was excised with a 15 blade scalpel.  The defect was converted into a full thickness wedge to facilite a better cosmetic result.  Small vessels were then tied off with 5-0 monocyrl. The orbicularis oris, superficial fascia, adipose and dermis were then reapproximated.  After the deeper layers were approximated the epidermis was reapproximated with particular care given to realign the vermilion border.

## 2025-01-17 NOTE — OB PROVIDER H&P - ATTENDING COMMENTS
Agree with assessment and plan as above  Oblique presentation, will consult MFM for ECV  For induction of labor if successful version    Charlie Yi MD

## 2025-01-18 ENCOUNTER — TRANSCRIPTION ENCOUNTER (OUTPATIENT)
Age: 30
End: 2025-01-18

## 2025-01-18 LAB — T PALLIDUM AB TITR SER: NEGATIVE — SIGNIFICANT CHANGE UP

## 2025-01-18 PROCEDURE — 59409 OBSTETRICAL CARE: CPT | Mod: U9,GC

## 2025-01-18 RX ORDER — KETOROLAC TROMETHAMINE 10 MG
30 TABLET ORAL ONCE
Refills: 0 | Status: DISCONTINUED | OUTPATIENT
Start: 2025-01-18 | End: 2025-01-18

## 2025-01-18 RX ORDER — OXYCODONE HYDROCHLORIDE 30 MG/1
5 TABLET ORAL ONCE
Refills: 0 | Status: DISCONTINUED | OUTPATIENT
Start: 2025-01-18 | End: 2025-01-20

## 2025-01-18 RX ORDER — IBUPROFEN 600 MG/1
600 TABLET, FILM COATED ORAL EVERY 6 HOURS
Refills: 0 | Status: COMPLETED | OUTPATIENT
Start: 2025-01-18 | End: 2025-12-17

## 2025-01-18 RX ORDER — PNV WITH CALCIUM NO.11/IRON/FA 65 MG-1 MG
1 TABLET ORAL DAILY
Refills: 0 | Status: DISCONTINUED | OUTPATIENT
Start: 2025-01-18 | End: 2025-01-20

## 2025-01-18 RX ORDER — PRAMOXINE HCL 1 %
1 CREAM (GRAM) RECTAL EVERY 4 HOURS
Refills: 0 | Status: DISCONTINUED | OUTPATIENT
Start: 2025-01-18 | End: 2025-01-20

## 2025-01-18 RX ORDER — HYDROCORTISONE 1 %
1 CREAM (GRAM) TOPICAL EVERY 6 HOURS
Refills: 0 | Status: DISCONTINUED | OUTPATIENT
Start: 2025-01-18 | End: 2025-01-20

## 2025-01-18 RX ORDER — PNV WITH CALCIUM NO.11/IRON/FA 65 MG-1 MG
0 TABLET ORAL
Refills: 0 | DISCHARGE

## 2025-01-18 RX ORDER — MAGNESIUM HYDROXIDE 400 MG/5ML
30 SUSPENSION, ORAL (FINAL DOSE FORM) ORAL
Refills: 0 | Status: DISCONTINUED | OUTPATIENT
Start: 2025-01-18 | End: 2025-01-20

## 2025-01-18 RX ORDER — OXYTOCIN/0.9 % SODIUM CHLORIDE 10/500ML
167 PLASTIC BAG, INJECTION (ML) INTRAVENOUS
Qty: 30 | Refills: 0 | Status: DISCONTINUED | OUTPATIENT
Start: 2025-01-18 | End: 2025-01-19

## 2025-01-18 RX ORDER — OXYCODONE HYDROCHLORIDE 30 MG/1
5 TABLET ORAL
Refills: 0 | Status: DISCONTINUED | OUTPATIENT
Start: 2025-01-18 | End: 2025-01-20

## 2025-01-18 RX ORDER — OXYTOCIN/0.9 % SODIUM CHLORIDE 10/500ML
2 PLASTIC BAG, INJECTION (ML) INTRAVENOUS
Qty: 30 | Refills: 0 | Status: DISCONTINUED | OUTPATIENT
Start: 2025-01-18 | End: 2025-01-19

## 2025-01-18 RX ORDER — WITCH HAZEL 86 ML/100ML
1 OIL ORAL; TOPICAL EVERY 4 HOURS
Refills: 0 | Status: DISCONTINUED | OUTPATIENT
Start: 2025-01-18 | End: 2025-01-20

## 2025-01-18 RX ORDER — IBUPROFEN 600 MG/1
600 TABLET, FILM COATED ORAL EVERY 6 HOURS
Refills: 0 | Status: DISCONTINUED | OUTPATIENT
Start: 2025-01-18 | End: 2025-01-20

## 2025-01-18 RX ORDER — FERROUS SULFATE 325(65) MG
0 TABLET ORAL
Refills: 0 | DISCHARGE

## 2025-01-18 RX ORDER — BACTERIOSTATIC SODIUM CHLORIDE 0.9 %
3 VIAL (ML) INJECTION EVERY 8 HOURS
Refills: 0 | Status: DISCONTINUED | OUTPATIENT
Start: 2025-01-18 | End: 2025-01-20

## 2025-01-18 RX ORDER — ONDANSETRON 4 MG/1
4 TABLET, ORALLY DISINTEGRATING ORAL ONCE
Refills: 0 | Status: COMPLETED | OUTPATIENT
Start: 2025-01-18 | End: 2025-01-18

## 2025-01-18 RX ORDER — CLOSTRIDIUM TETANI TOXOID ANTIGEN (FORMALDEHYDE INACTIVATED), CORYNEBACTERIUM DIPHTHERIAE TOXOID ANTIGEN (FORMALDEHYDE INACTIVATED), BORDETELLA PERTUSSIS TOXOID ANTIGEN (GLUTARALDEHYDE INACTIVATED), BORDETELLA PERTUSSIS FILAMENTOUS HEMAGGLUTININ ANTIGEN (FORMALDEHYDE INACTIVATED), BORDETELLA PERTUSSIS PERTACTIN ANTIGEN, AND BORDETELLA PERTUSSIS FIMBRIAE 2/3 ANTIGEN 5; 2; 2.5; 5; 3; 5 [LF]/.5ML; [LF]/.5ML; UG/.5ML; UG/.5ML; UG/.5ML; UG/.5ML
0.5 INJECTION, SUSPENSION INTRAMUSCULAR ONCE
Refills: 0 | Status: DISCONTINUED | OUTPATIENT
Start: 2025-01-18 | End: 2025-01-20

## 2025-01-18 RX ORDER — IBUPROFEN 600 MG/1
1 TABLET, FILM COATED ORAL
Qty: 0 | Refills: 0 | DISCHARGE
Start: 2025-01-18

## 2025-01-18 RX ORDER — DIPHENHYDRAMINE HCL 25 MG
25 CAPSULE ORAL EVERY 6 HOURS
Refills: 0 | Status: DISCONTINUED | OUTPATIENT
Start: 2025-01-18 | End: 2025-01-20

## 2025-01-18 RX ORDER — ACETAMINOPHEN 160 MG/5ML
975 SUSPENSION ORAL
Refills: 0 | Status: DISCONTINUED | OUTPATIENT
Start: 2025-01-18 | End: 2025-01-20

## 2025-01-18 RX ADMIN — ACETAMINOPHEN 975 MILLIGRAM(S): 160 SUSPENSION ORAL at 17:23

## 2025-01-18 RX ADMIN — Medication 30 MILLIGRAM(S): at 12:55

## 2025-01-18 RX ADMIN — ACETAMINOPHEN 975 MILLIGRAM(S): 160 SUSPENSION ORAL at 20:51

## 2025-01-18 RX ADMIN — Medication 167 MILLIUNIT(S)/MIN: at 12:55

## 2025-01-18 RX ADMIN — Medication 2 MILLIUNIT(S)/MIN: at 06:20

## 2025-01-18 RX ADMIN — ONDANSETRON 4 MILLIGRAM(S): 4 TABLET, ORALLY DISINTEGRATING ORAL at 00:38

## 2025-01-18 RX ADMIN — Medication 30 MILLIGRAM(S): at 14:20

## 2025-01-18 RX ADMIN — ACETAMINOPHEN 975 MILLIGRAM(S): 160 SUSPENSION ORAL at 21:47

## 2025-01-18 RX ADMIN — ACETAMINOPHEN 975 MILLIGRAM(S): 160 SUSPENSION ORAL at 15:25

## 2025-01-18 RX ADMIN — Medication 3 MILLILITER(S): at 21:45

## 2025-01-18 NOTE — OB PROVIDER LABOR PROGRESS NOTE - NS_SUBJECTIVE/OBJECTIVE_OBGYN_ALL_OB_FT
PGY4 Labor Note    Patient seen and evaluated at bedside.  Denies complaints.  Comfortable w/ epidural.      T(C): 36.5 (01-17-25 @ 17:35), Max: 36.6 (01-17-25 @ 16:22)  HR: 80 (01-17-25 @ 17:57) (75 - 127)  BP: 128/75 (01-17-25 @ 17:56) (107/56 - 128/75)  RR: 18 (01-17-25 @ 17:35) (17 - 18)  SpO2: 96% (01-17-25 @ 17:57) (88% - 100%)
Evaluated for AROM
Feeling rectal pressure
pt due to labor assessment

## 2025-01-18 NOTE — OB NEONATOLOGY/PEDIATRICIAN DELIVERY SUMMARY - NSPEDSNEONOTESA_OBGYN_ALL_OB_FT
Baby is a 40.4 wk AGA M born to a 28 y/o  mother via . Peds called to delivery for category II tracing. Maternal history uncomplicated. Pregnancy complicated by NRNST and transverse fetal lie. Maternal blood type A+. PNL HIV negative, HepB negative, RPR non-reactive, and Rubella immune. GBS negative on .  ROM at 07:49 on , clear fluids. Delivery uncomplicated, terminal mec. Baby born vigorous and crying spontaneously. Warmed, dried, suctioned and stimulated. Apgars 8/9. Highest maternal temp 36.7. EOS 0.07. Mom plans to breastfeed and consents HepB vaccine. Circ requested.

## 2025-01-18 NOTE — OB PROVIDER LABOR PROGRESS NOTE - ASSESSMENT
Patient comfortable with epidural. Cephalic presentation confirmed on sono. AROM with clear fluid.   Head well engaged. Pitocin paused due to tachysystole. Reassuring fetal status  Selam Thom
Patient feeling pelvic pressure, fully dilated  Will start pushing  Anticipate   Overall reassuring fetal and maternal status
A/P: 29y P1 admitted for IOL 2/2 NRNST s/p ECV from oblique lie   - vaginal cytotec placed. cervix still closed   - sono again performed and fetus confirmed to be vertex   - continuous monitoring/toco   - epi for pain; top off prn     Carlotta Ahmed PGY4
pt tolerated exam well    - discontinue cytotec  - start pitocin 2x2  - reexamine in 3-4hrs for AROM after sono confirmation of vertex    Amyeo Afroz Jereen, PGY-4  d/w Dr Sky

## 2025-01-18 NOTE — OB PROVIDER DELIVERY SUMMARY - NSPLACDELIVDETAIL_OBGYN_ALL_OB
[address]  Dept: [phone]  Dept Fax: [phone]  Loc: [phone]    ELSY Gomezjenny Wu, [facility] Office Note - Follow up Patient    Patient:  Jamar Denise  YOB: 1980  Date: 11/6/2018    The patient is a 34 y.o. female who presents today for evaluation of the following problems: urinary frequency  Chief Complaint   Patient presents with    Incontinence     2 mo follow up, is doing well    referred/consultation requested by PIERO Reeves. HISTORY OF PRESENT ILLNESS:     Onset was  Years ago  Overall, the problem(s) are better  Severity is described as moderate. Associated Symptoms: No dysuria, no gross hematuria. Current Pain Severity: 0     100 percent improvement  Was started on Oxybutynin      Previous records:    Frequency. Some incontinence. Mixed incontinence urge>>>stress. Some hesitancy  No recurrent infections  No hx of kidney stones  No pads  Does not drink many bladder irritants. Had a hx of nocturia- was on nasal spray. Nocturia not as bothersome    Here after trying vesicare. It did help but was too expensive        Requested/reviewed records from Tabatha Law, [facility] office and/or outside physician/EMR    (Patient's old records have been requested, reviewed and pertinent findings summarized in today's note.)    Procedures Today: N/A    Last several PSA's:  No results found for: PSA    Last total testosterone:  No results found for: TESTOSTERONE    Urinalysis today:  No results found for this visit on 11/05/18.     Last BUN and creatinine:  Lab Results   Component Value Date    BUN 17 12/30/2017     Lab Results   Component Value Date    CREATININE 0.68 12/30/2017       Additional Lab/Culture results: none    Imaging Reviewed during this Office Visit:   Grant Mancuso MD independently reviewed the images and verified the radiology reports from:    CT scan 2014    IMPRESSION:    No
Spontaneous

## 2025-01-18 NOTE — OB PROVIDER DELIVERY SUMMARY - NSPROVIDERDELIVERYNOTE_OBGYN_ALL_OB_FT
Patient became fully dilated and desired to push. Patient pushed well and delivered a liveborn male infant from OA position. Head, both shoulders and body delivered easily. Nuchal cord around the neck reduced after delivery. Delayed cord clamping done and gases collected. Placenta delivered spontaneously and intact.  Vaginal exam revealed 2nd degree laceration which was repaired with 2.0 chromic in a running fashion. Left Sulcal laceration was repaired with 2-0 chromic as well as periurethral laceration. Friable areas of tissue noted. Vaginal packing x1 placed with a arzate catheter.  Count correctx2. Good hemostasis noted.

## 2025-01-18 NOTE — DISCHARGE NOTE OB - FINANCIAL ASSISTANCE
[facility] provides services at a reduced cost to those who are determined to be eligible through [facility]’s financial assistance program. Information regarding [facility]’s financial assistance program can be found by going to [email or web address] or by calling [phone].

## 2025-01-18 NOTE — OB RN DELIVERY SUMMARY - NS_SEPSISRSKCALC_OBGYN_ALL_OB_FT
EOS calculated successfully. EOS Risk Factor: 0.5/1000 live births ([facility] national incidence); GA=40w4d; Temp=98.1; ROM=4.067; GBS='Negative'; Antibiotics='No antibiotics or any antibiotics < 2 hrs prior to birth'  
Satisfactory

## 2025-01-18 NOTE — DISCHARGE NOTE OB - CARE PLAN
Principal Discharge DX:	Vaginal delivery  Assessment and plan of treatment:	After discharge, please stay on pelvic rest for 6 weeks, meaning no sexual intercourse, no tampons and no douching. Expect to have vaginal bleeding/spotting for up to six weeks.  The bleeding should get lighter and more white/light brown with time.  For bleeding soaking more than a pad an hour or passing clots greater than the size of your fist, come in to the emergency department   1 Principal Discharge DX:	Vaginal delivery  Assessment and plan of treatment:	After discharge, please stay on pelvic rest for 6 weeks, meaning no sexual intercourse, no tampons and no douching. Expect to have vaginal bleeding/spotting for up to six weeks.  The bleeding should get lighter and more white/light brown with time.  For bleeding soaking more than a pad an hour or passing clots greater than the size of your fist, come in to the emergency department  Secondary Diagnosis:	Chorioamnionitis  Assessment and plan of treatment:	S/P Zosyn x 4 doses

## 2025-01-18 NOTE — DISCHARGE NOTE OB - CARE PROVIDER_API CALL
Tracie Mallory  Obstetrics and Gynecology  [address] [place] [address]  Phone: [phone]  Fax: [phone]  Follow Up Time:

## 2025-01-18 NOTE — OB PROVIDER DELIVERY SUMMARY - NSSELHIDDEN_OBGYN_ALL_OB_FT
[NS_DeliveryAttending1_OBGYN_ALL_OB_FT:[id number]==],[NS_DeliveryRN_OBGYN_ALL_OB_FT:[id number]=]

## 2025-01-18 NOTE — DISCHARGE NOTE OB - PLAN OF CARE
After discharge, please stay on pelvic rest for 6 weeks, meaning no sexual intercourse, no tampons and no douching. Expect to have vaginal bleeding/spotting for up to six weeks.  The bleeding should get lighter and more white/light brown with time.  For bleeding soaking more than a pad an hour or passing clots greater than the size of your fist, come in to the emergency department S/P Zosyn x 4 doses

## 2025-01-18 NOTE — OB RN DELIVERY SUMMARY - NSSELHIDDEN_OBGYN_ALL_OB_FT
[NS_DeliveryAttending1_OBGYN_ALL_OB_FT:[id number]==],[NS_DeliveryRN_OBGYN_ALL_OB_FT:[id number]]

## 2025-01-18 NOTE — DISCHARGE NOTE OB - MATERIALS PROVIDED
Vaccinations/[facility]  Screening Program/  Immunization Record/Breastfeeding Log/Breastfeeding Mother’s Support Group Information/Guide to Postpartum Care/[facility] Hearing Screen Program/Back To Sleep Handout/Shaken Baby Prevention Handout/Breastfeeding Guide and Packet/Birth Certificate Instructions/Tdap Vaccination ([facility] Pub Date: 2012)

## 2025-01-18 NOTE — DISCHARGE NOTE OB - HOSPITAL COURSE
30 y/o P1 @ 40 weeks admitted s/p ECV with NRNST for induction of labor. Delivered viable male infant apgars 9.9. Vaginal packing placed for friable tissue. Removed the following morning,  Postpartum course uncomplicated.

## 2025-01-18 NOTE — DISCHARGE NOTE OB - PATIENT PORTAL LINK FT
You can access the FollowMyHealth Patient Portal offered by [facility] by registering at the following website: [email or web address]. By joining [employer]’s FollowMyHealth portal, you will also be able to view your health information using other applications (apps) compatible with our system.

## 2025-01-18 NOTE — DISCHARGE NOTE OB - MEDICATION SUMMARY - MEDICATIONS TO TAKE
I will START or STAY ON the medications listed below when I get home from the hospital:    ibuprofen 600 mg oral tablet  -- 1 tab(s) by mouth every 6 hours  -- Indication: For pain   I will START or STAY ON the medications listed below when I get home from the hospital:    ibuprofen 600 mg oral tablet  -- 1 tab(s) by mouth every 6 hours as needed for  moderate pain  -- Indication: For pain    acetaminophen 500 mg oral tablet  -- 2 tab(s) by mouth every 6 hours as needed for  mild pain  -- Indication: For Pain    multivitamin, prenatal  -- 1 tab(s) by mouth once a day  -- Indication: For Vitamin    ferrous sulfate 325 mg (65 mg elemental iron) oral tablet  -- 1 tab(s) by mouth 2 times a day  -- Indication: For Anemia    ascorbic acid 500 mg oral capsule  -- 1 cap(s) by mouth once a day  -- Indication: For Vitamin

## 2025-01-18 NOTE — CHART NOTE - NSCHARTNOTEFT_GEN_A_CORE
Notified by RN that pt felt abdominal binder move and was concerned baby may have flipped. BSUS re-confirmed vtx presentation and patient reassured.    Sindy Weiss, PGY-3

## 2025-01-18 NOTE — DISCHARGE NOTE OB - YOU MAY BE A PASSENGER IN A CAR.  BE SURE TO GET OUT OF THE CAR AND STRETCH IF YOU TRAVEL FOR A LONG PERIOD OF TIME
Refill policies:    • Allow 2-3 business days for refills; controlled substances may take longer.   • Contact your pharmacy at least 5 days prior to running out of medication and have them send an electronic request or submit request through the “request re Depending on your insurance carrier, approval may take 3-10 days. It is highly recommended patients contact their insurance carrier directly to determine coverage.   If test is done without insurance authorization, patient may be responsible for the entire regular rate and rhythm/S1 S2 present/peripheral edema Statement Selected

## 2025-01-19 LAB
HCT VFR BLD CALC: 29.7 % — LOW (ref 34.5–45)
HGB BLD-MCNC: 9.7 G/DL — LOW (ref 11.5–15.5)
MCHC RBC-ENTMCNC: 27.1 PG — SIGNIFICANT CHANGE UP (ref 27–34)
MCHC RBC-ENTMCNC: 32.7 G/DL — SIGNIFICANT CHANGE UP (ref 32–36)
MCV RBC AUTO: 83 FL — SIGNIFICANT CHANGE UP (ref 80–100)
NRBC # BLD AUTO: 0 K/UL — SIGNIFICANT CHANGE UP (ref 0–0)
NRBC # BLD: 0 /100 WBCS — SIGNIFICANT CHANGE UP (ref 0–0)
NRBC # FLD: 0 K/UL — SIGNIFICANT CHANGE UP (ref 0–0)
NRBC BLD-RTO: 0 /100 WBCS — SIGNIFICANT CHANGE UP (ref 0–0)
PLATELET # BLD AUTO: 175 K/UL — SIGNIFICANT CHANGE UP (ref 150–400)
RBC # BLD: 3.58 M/UL — LOW (ref 3.8–5.2)
RBC # FLD: 14.6 % — HIGH (ref 10.3–14.5)
WBC # BLD: 10.29 K/UL — SIGNIFICANT CHANGE UP (ref 3.8–10.5)
WBC # FLD AUTO: 10.29 K/UL — SIGNIFICANT CHANGE UP (ref 3.8–10.5)

## 2025-01-19 RX ORDER — SENNOSIDES 8.6 MG
1 TABLET ORAL
Refills: 0 | Status: DISCONTINUED | OUTPATIENT
Start: 2025-01-19 | End: 2025-01-20

## 2025-01-19 RX ORDER — FERROUS SULFATE 325(65) MG
325 TABLET ORAL DAILY
Refills: 0 | Status: DISCONTINUED | OUTPATIENT
Start: 2025-01-19 | End: 2025-01-20

## 2025-01-19 RX ADMIN — IBUPROFEN 600 MILLIGRAM(S): 600 TABLET, FILM COATED ORAL at 18:58

## 2025-01-19 RX ADMIN — Medication 1 APPLICATION(S): at 11:07

## 2025-01-19 RX ADMIN — ACETAMINOPHEN 975 MILLIGRAM(S): 160 SUSPENSION ORAL at 08:57

## 2025-01-19 RX ADMIN — IBUPROFEN 600 MILLIGRAM(S): 600 TABLET, FILM COATED ORAL at 08:18

## 2025-01-19 RX ADMIN — ACETAMINOPHEN 975 MILLIGRAM(S): 160 SUSPENSION ORAL at 03:59

## 2025-01-19 RX ADMIN — IBUPROFEN 600 MILLIGRAM(S): 600 TABLET, FILM COATED ORAL at 03:01

## 2025-01-19 RX ADMIN — Medication 325 MILLIGRAM(S): at 14:54

## 2025-01-19 RX ADMIN — IBUPROFEN 600 MILLIGRAM(S): 600 TABLET, FILM COATED ORAL at 18:28

## 2025-01-19 RX ADMIN — IBUPROFEN 600 MILLIGRAM(S): 600 TABLET, FILM COATED ORAL at 06:45

## 2025-01-19 RX ADMIN — IBUPROFEN 600 MILLIGRAM(S): 600 TABLET, FILM COATED ORAL at 23:41

## 2025-01-19 RX ADMIN — Medication 1 SPRAY(S): at 11:07

## 2025-01-19 RX ADMIN — ACETAMINOPHEN 975 MILLIGRAM(S): 160 SUSPENSION ORAL at 09:45

## 2025-01-19 RX ADMIN — Medication 1 TABLET(S): at 11:06

## 2025-01-19 RX ADMIN — Medication 3 MILLILITER(S): at 08:18

## 2025-01-19 RX ADMIN — IBUPROFEN 600 MILLIGRAM(S): 600 TABLET, FILM COATED ORAL at 11:06

## 2025-01-19 RX ADMIN — IBUPROFEN 600 MILLIGRAM(S): 600 TABLET, FILM COATED ORAL at 02:01

## 2025-01-19 RX ADMIN — ACETAMINOPHEN 975 MILLIGRAM(S): 160 SUSPENSION ORAL at 14:54

## 2025-01-19 RX ADMIN — ACETAMINOPHEN 975 MILLIGRAM(S): 160 SUSPENSION ORAL at 21:30

## 2025-01-19 RX ADMIN — IBUPROFEN 600 MILLIGRAM(S): 600 TABLET, FILM COATED ORAL at 12:00

## 2025-01-19 RX ADMIN — Medication 3 MILLILITER(S): at 14:07

## 2025-01-19 RX ADMIN — ACETAMINOPHEN 975 MILLIGRAM(S): 160 SUSPENSION ORAL at 16:42

## 2025-01-19 RX ADMIN — ACETAMINOPHEN 975 MILLIGRAM(S): 160 SUSPENSION ORAL at 20:54

## 2025-01-19 RX ADMIN — Medication 30 MILLILITER(S): at 11:06

## 2025-01-19 RX ADMIN — ACETAMINOPHEN 975 MILLIGRAM(S): 160 SUSPENSION ORAL at 04:59

## 2025-01-19 NOTE — PROGRESS NOTE ADULT - ATTENDING COMMENTS
Agree with assessment and plan as above  Vaginal packing and arzate in place, to be removed this am  F/u H/H this am  For discharge tomorrow    Charlie Yi MD

## 2025-01-19 NOTE — PROGRESS NOTE ADULT - ASSESSMENT
A/P: 30yo PPD#1 s/p .  Patient is stable and doing well post-partum.   #2* and periurethral laceration   -  d/t lacerations   -  and arzate until  AM labs back   - f/u AM CBC ()     #Postpartum   - Pain well controlled, continue current pain regimen  - Increase ambulation, SCDs when not ambulating  - Continue regular diet    Marianne Ballard, PGY1

## 2025-01-19 NOTE — PROGRESS NOTE ADULT - SUBJECTIVE AND OBJECTIVE BOX
OB Progress Note:  PPD#1    S: 28yo  PPD#1 s/p . Patient feels well. Pain is well controlled. She is tolerating a regular diet and passing flatus. She is voiding spontaneously, and ambulating without difficulty. Denies CP/SOB. Denies lightheadedness/dizziness. Denies N/V.    Vaginal packing and arzate in place     O:  Vitals:  Vital Signs Last 24 Hrs  T(C): 36.7 (2025 01:57), Max: 37 (2025 18:00)  T(F): 98 (2025 01:57), Max: 98.6 (2025 18:00)  HR: 88 (2025 01:57) (71 - 132)  BP: 112/70 (2025 01:57) (105/58 - 137/84)  BP(mean): --  RR: 18 (2025 01:57) (16 - 19)  SpO2: 99% (2025 01:57) (87% - 100%)    Parameters below as of 2025 01:57  Patient On (Oxygen Delivery Method): room air        MEDICATIONS  (STANDING):  acetaminophen     Tablet .. 975 milliGRAM(s) Oral <User Schedule>  diphtheria/tetanus/pertussis (acellular) Vaccine (Adacel) 0.5 milliLiter(s) IntraMuscular once  ibuprofen  Tablet. 600 milliGRAM(s) Oral every 6 hours  oxytocin Infusion 167 milliUNIT(s)/Min (167 mL/Hr) IV Continuous <Continuous>  oxytocin Infusion 167 milliUNIT(s)/Min (167 mL/Hr) IV Continuous <Continuous>  oxytocin Infusion. 2 milliUNIT(s)/Min (2 mL/Hr) IV Continuous <Continuous>  prenatal multivitamin 1 Tablet(s) Oral daily  sodium chloride 0.9% lock flush 3 milliLiter(s) IV Push every 8 hours      Labs:  Blood type: A Positive  Rubella IgG: RPR: Negative                          11.3[L]   6.85 >-----------< 221    ( 17 @ 15:50 )             33.7[L]                  Physical Exam:  General: NAD  Pulm: breathing comfortably on room air   Abdomen: soft, non-tender, non-distended, fundus firm  Vaginal: Lochia wnl  Extremities: No erythema/edema     OB Progress Note:  PPD#1    S: 28yo  PPD#1 s/p . Patient feels well. Pain is well controlled. She is tolerating a regular diet and passing flatus. Vaginal packing and arzate in place, has not yet ambulated. Denies CP/SOB. Denies lightheadedness/dizziness. Denies N/V.      O:  Vitals:  Vital Signs Last 24 Hrs  T(C): 36.7 (2025 01:57), Max: 37 (2025 18:00)  T(F): 98 (2025 01:57), Max: 98.6 (2025 18:00)  HR: 88 (2025 01:57) (71 - 132)  BP: 112/70 (2025 01:57) (105/58 - 137/84)  BP(mean): --  RR: 18 (2025 01:57) (16 - 19)  SpO2: 99% (2025 01:57) (87% - 100%)    Parameters below as of 2025 01:57  Patient On (Oxygen Delivery Method): room air        MEDICATIONS  (STANDING):  acetaminophen     Tablet .. 975 milliGRAM(s) Oral <User Schedule>  diphtheria/tetanus/pertussis (acellular) Vaccine (Adacel) 0.5 milliLiter(s) IntraMuscular once  ibuprofen  Tablet. 600 milliGRAM(s) Oral every 6 hours  oxytocin Infusion 167 milliUNIT(s)/Min (167 mL/Hr) IV Continuous <Continuous>  oxytocin Infusion 167 milliUNIT(s)/Min (167 mL/Hr) IV Continuous <Continuous>  oxytocin Infusion. 2 milliUNIT(s)/Min (2 mL/Hr) IV Continuous <Continuous>  prenatal multivitamin 1 Tablet(s) Oral daily  sodium chloride 0.9% lock flush 3 milliLiter(s) IV Push every 8 hours      Labs:  Blood type: A Positive  Rubella IgG: RPR: Negative                          11.3[L]   6.85 >-----------< 221    (  @ 15:50 )             33.7[L]                  Physical Exam:  General: NAD  Pulm: breathing comfortably on room air   Abdomen: soft, non-tender, non-distended, fundus firm  Vaginal: Lochia wnl  Extremities: No erythema/edema     OB Progress Note:  PPD#1    S: 30yo  PPD#1 s/p . Patient feels well. Pain is well controlled. She is tolerating a regular diet and passing flatus. Vaginal packing and arzate in place, has not yet ambulated. Denies CP/SOB. Denies lightheadedness/dizziness. Denies N/V.      O:  Vitals:  Vital Signs Last 24 Hrs  T(C): 36.7 (2025 01:57), Max: 37 (2025 18:00)  T(F): 98 (2025 01:57), Max: 98.6 (2025 18:00)  HR: 88 (2025 01:57) (71 - 132)  BP: 112/70 (2025 01:57) (105/58 - 137/84)  BP(mean): --  RR: 18 (2025 01:57) (16 - 19)  SpO2: 99% (2025 01:57) (87% - 100%)    Parameters below as of 2025 01:57  Patient On (Oxygen Delivery Method): room air        MEDICATIONS  (STANDING):  acetaminophen     Tablet .. 975 milliGRAM(s) Oral <User Schedule>  diphtheria/tetanus/pertussis (acellular) Vaccine (Adacel) 0.5 milliLiter(s) IntraMuscular once  ibuprofen  Tablet. 600 milliGRAM(s) Oral every 6 hours  oxytocin Infusion 167 milliUNIT(s)/Min (167 mL/Hr) IV Continuous <Continuous>  oxytocin Infusion 167 milliUNIT(s)/Min (167 mL/Hr) IV Continuous <Continuous>  oxytocin Infusion. 2 milliUNIT(s)/Min (2 mL/Hr) IV Continuous <Continuous>  prenatal multivitamin 1 Tablet(s) Oral daily  sodium chloride 0.9% lock flush 3 milliLiter(s) IV Push every 8 hours      Labs:  Blood type: A Positive  Rubella IgG: RPR: Negative                          11.3[L]   6.85 >-----------< 221    (  @ 15:50 )             33.7[L]          Physical Exam:  General: NAD  Pulm: breathing comfortably on room air   Abdomen: soft, non-tender, non-distended, fundus firm  Vaginal: VPx1 and arzate in place   Extremities: No erythema/edema

## 2025-01-19 NOTE — CHART NOTE - NSCHARTNOTEFT_GEN_A_CORE
Patient seen for vaginal packing evaluation/removal.     Patient doing well without complaints. Complaining of vaginal pressure, likely due to vaginal packing in place.     PE:   Physical Exam:  General: NAD, resting comfortably in bed   Abdomen: Soft, non-tender, non-distended, fundus firm  Pelvic: Lochia minimal on pad  : Joshi draining clear urine, 400 in bag     Vaginal packing x1 removed, intact without complications. Joshi d'bert with 400cc urine in bag. No active bleeding from vagina after removal. DTV@7PM.     Plan per Dr. Kassidy Read, PGY-1

## 2025-01-20 VITALS
OXYGEN SATURATION: 100 % | RESPIRATION RATE: 18 BRPM | HEART RATE: 80 BPM | DIASTOLIC BLOOD PRESSURE: 62 MMHG | TEMPERATURE: 98 F | SYSTOLIC BLOOD PRESSURE: 103 MMHG

## 2025-01-20 RX ORDER — ASCORBIC ACID 500 MG/ML
1 VIAL (ML) INJECTION
Qty: 0 | Refills: 0 | DISCHARGE

## 2025-01-20 RX ORDER — ACETAMINOPHEN 160 MG/5ML
2 SUSPENSION ORAL
Qty: 0 | Refills: 0 | DISCHARGE
Start: 2025-01-20

## 2025-01-20 RX ORDER — FERROUS SULFATE 325(65) MG
1 TABLET ORAL
Qty: 0 | Refills: 0 | DISCHARGE
Start: 2025-01-20

## 2025-01-20 RX ORDER — PNV WITH CALCIUM NO.11/IRON/FA 65 MG-1 MG
1 TABLET ORAL
Qty: 0 | Refills: 0 | DISCHARGE
Start: 2025-01-20

## 2025-01-20 RX ADMIN — ACETAMINOPHEN 975 MILLIGRAM(S): 160 SUSPENSION ORAL at 09:27

## 2025-01-20 RX ADMIN — ACETAMINOPHEN 975 MILLIGRAM(S): 160 SUSPENSION ORAL at 08:27

## 2025-01-20 RX ADMIN — IBUPROFEN 600 MILLIGRAM(S): 600 TABLET, FILM COATED ORAL at 05:27

## 2025-01-20 RX ADMIN — Medication 325 MILLIGRAM(S): at 12:07

## 2025-01-20 RX ADMIN — ACETAMINOPHEN 975 MILLIGRAM(S): 160 SUSPENSION ORAL at 04:00

## 2025-01-20 RX ADMIN — IBUPROFEN 600 MILLIGRAM(S): 600 TABLET, FILM COATED ORAL at 13:00

## 2025-01-20 RX ADMIN — IBUPROFEN 600 MILLIGRAM(S): 600 TABLET, FILM COATED ORAL at 00:15

## 2025-01-20 RX ADMIN — IBUPROFEN 600 MILLIGRAM(S): 600 TABLET, FILM COATED ORAL at 12:07

## 2025-01-20 RX ADMIN — ACETAMINOPHEN 975 MILLIGRAM(S): 160 SUSPENSION ORAL at 03:29

## 2025-01-20 RX ADMIN — Medication 1 TABLET(S): at 12:07

## 2025-01-20 RX ADMIN — IBUPROFEN 600 MILLIGRAM(S): 600 TABLET, FILM COATED ORAL at 05:55

## 2025-01-20 NOTE — PROGRESS NOTE ADULT - SUBJECTIVE AND OBJECTIVE BOX
S: 29yo PPD#2 s/p  over 2nd degree perineal laceration with periurethral and Lt sulcal tears. S/P vaginal packing and arzate. , H/H 11.3/33.7->9.7/29.7.  Patient feels well. Pain is well controlled. She is tolerating a regular diet and passing flatus. She is voiding spontaneously, and ambulating without difficulty. Denies CP/SOB. Denies lightheadedness/dizziness. Denies N/V.    O:  Vitals:   Vital Signs Last 24 Hrs  T(C): 36.7 (2025 06:15), Max: 36.7 (2025 06:15)  T(F): 98 (2025 06:15), Max: 98 (2025 06:15)  HR: 80 (2025 06:15) (80 - 101)  BP: 103/62 (2025 06:15) (103/62 - 114/62)  BP(mean): --  RR: 18 (2025 06:15) (17 - 18)  SpO2: 100% (2025 06:15) (98% - 100%)    Parameters above as of 2025 17:29  Patient On (Oxygen Delivery Method): room air        MEDICATIONS  (STANDING):  acetaminophen     Tablet .. 975 milliGRAM(s) Oral <User Schedule>  diphtheria/tetanus/pertussis (acellular) Vaccine (Adacel) 0.5 milliLiter(s) IntraMuscular once  ferrous    sulfate 325 milliGRAM(s) Oral daily  ibuprofen  Tablet. 600 milliGRAM(s) Oral every 6 hours  prenatal multivitamin 1 Tablet(s) Oral daily  sodium chloride 0.9% lock flush 3 milliLiter(s) IV Push every 8 hours    MEDICATIONS  (PRN):  benzocaine 20%/menthol 0.5% Spray 1 Spray(s) Topical every 6 hours PRN for Perineal discomfort  dibucaine 1% Ointment 1 Application(s) Topical every 6 hours PRN Perineal discomfort  diphenhydrAMINE 25 milliGRAM(s) Oral every 6 hours PRN Pruritus  hydrocortisone 1% Cream 1 Application(s) Topical every 6 hours PRN Moderate Pain (4-6)  lanolin Ointment 1 Application(s) Topical every 6 hours PRN nipple soreness  magnesium hydroxide Suspension 30 milliLiter(s) Oral two times a day PRN Constipation  oxyCODONE    IR 5 milliGRAM(s) Oral every 3 hours PRN Moderate to Severe Pain (4-10)  oxyCODONE    IR 5 milliGRAM(s) Oral once PRN Moderate to Severe Pain (4-10)  pramoxine 1%/zinc 5% Cream 1 Application(s) Topical every 4 hours PRN Moderate Pain (4-6)  senna 1 Tablet(s) Oral two times a day PRN Constipation  simethicone 80 milliGRAM(s) Chew every 4 hours PRN Gas  witch hazel Pads 1 Application(s) Topical every 4 hours PRN Perineal discomfort      Labs:  Blood type: A Positive  Rubella IgG: RPR: Negative                          9.7[L]   10.29 >-----------< 175    (  @ 06:10 )             29.7[L]                        11.3[L]   6.85 >-----------< 221    (  @ 15:50 )             33.7[L]      Physical Exam:  General: NAD  Abdomen: soft, non-tender, non-distended, fundus firm  : Lochia wnl, perineal sutures intact. Voiding spontaneously  Extremities: No erythema/edema/calf tenderness    A/P: 29yo PPD#2 s/p  over 2nd degree perineal laceration with periurethral and Lt sulcal tears. S/P vaginal packing and arzate. , H/H 11.3/33.7->9.7/29.7. Patient is stable and doing well post-partum.      #Acute blood loss anemia  -  -H/H 11.3/33.7->9.7/29.7  -VSS, asymptomatic  -Continue iron daily    #Post-Partum  - Pain well controlled, continue current pain regimen  - Increase ambulation, SCDs when not ambulating  - Continue regular diet  - Discharge plan-stable for d/c today; F/U 6 weeks for PP check    MAMIE Teixeira-BC

## 2025-01-21 LAB
GP B STREP DNA SPEC QL NAA+PROBE: NOT DETECTED
SOURCE GBS: NORMAL

## 2025-02-03 NOTE — OB PROVIDER H&P - NS_FETALPRESENTATIONA_OBGYN_ALL_OB
pharyngeal erythema without exudates or lesions.   Neck:  Supple. There is no obvious adenopathy or neck tenderness.  Lungs:   Breath sounds: Normal chest expansion and breath sounds noted throughout.  No wheezes, rales, or rhonchi noted.    Heart:  Regular rate and rhythm, normal heart sounds, without pathological murmurs, ectopy, gallops, or rubs.  Skin:  Normal turgor.  Warm, dry, without visible rash.  Neurological:  Oriented.  Motor functions intact.       Lab / Imaging Results   (All laboratory and radiology results have been personally reviewed by myself)  Labs:  Results for orders placed or performed in visit on 02/03/25   POCT COVID-19, Antigen   Result Value Ref Range    SARS-COV-2, POC Not-Detected Not Detected    Lot Number [id number]     QC Pass/Fail pass     Performing Instrument BD Veritor    POCT Influenza A/B   Result Value Ref Range    Influenza A Ab Negataive     Influenza B Ab Negative        Imaging:  All Radiology results interpreted by Radiologist unless otherwise noted.  No orders to display         Assessment / Plan     Impression(s):  1. Acute URI    2. Cough in adult patient      Disposition:  Disposition: Advised Covid/Influenza rosas are negative.  Start Zithromax and Promethazine DM- advised on sedating effects. Stay well hydrated. Return to walk in care w/any worsening or concerning issues      
Cephalic

## 2025-02-26 NOTE — OB PROVIDER H&P - LIVING CHILDREN, OB PROFILE
Pt unable to participate in nutrition assessment interview at time of visit this morning. Discussed with spouse at phone number listed in EMR. Per spouse, pt had a good appetite prior to admit; consumed 2 meals + snacks daily. Pt takes a multivitamin, vitamin C, calcium and fish oil daily. Denies drinking protein shake supplements. NKFA; denies any restrictive cultural or [employer] food preferences. No chewing or swallowing difficulties noted with regular textured food. 
0